# Patient Record
Sex: MALE | Race: WHITE | Employment: OTHER | URBAN - NONMETROPOLITAN AREA
[De-identification: names, ages, dates, MRNs, and addresses within clinical notes are randomized per-mention and may not be internally consistent; named-entity substitution may affect disease eponyms.]

---

## 2022-10-05 ENCOUNTER — OFFICE VISIT (OUTPATIENT)
Dept: FAMILY MEDICINE CLINIC | Age: 67
End: 2022-10-05
Payer: MEDICARE

## 2022-10-05 ENCOUNTER — TELEPHONE (OUTPATIENT)
Dept: FAMILY MEDICINE CLINIC | Age: 67
End: 2022-10-05

## 2022-10-05 VITALS
TEMPERATURE: 97.7 F | HEART RATE: 67 BPM | WEIGHT: 217.4 LBS | SYSTOLIC BLOOD PRESSURE: 170 MMHG | OXYGEN SATURATION: 95 % | DIASTOLIC BLOOD PRESSURE: 90 MMHG | RESPIRATION RATE: 18 BRPM

## 2022-10-05 DIAGNOSIS — I10 PRIMARY HYPERTENSION: ICD-10-CM

## 2022-10-05 DIAGNOSIS — I50.9 CHRONIC CONGESTIVE HEART FAILURE, UNSPECIFIED HEART FAILURE TYPE (HCC): ICD-10-CM

## 2022-10-05 DIAGNOSIS — Z23 FLU VACCINE NEED: ICD-10-CM

## 2022-10-05 DIAGNOSIS — Z95.0 PACEMAKER: ICD-10-CM

## 2022-10-05 PROBLEM — T14.91XA SUICIDE ATTEMPT (HCC): Status: ACTIVE | Noted: 2022-08-17

## 2022-10-05 PROCEDURE — G0008 ADMIN INFLUENZA VIRUS VAC: HCPCS | Performed by: FAMILY MEDICINE

## 2022-10-05 PROCEDURE — 1123F ACP DISCUSS/DSCN MKR DOCD: CPT | Performed by: FAMILY MEDICINE

## 2022-10-05 PROCEDURE — 90674 CCIIV4 VAC NO PRSV 0.5 ML IM: CPT | Performed by: FAMILY MEDICINE

## 2022-10-05 PROCEDURE — 99204 OFFICE O/P NEW MOD 45 MIN: CPT | Performed by: FAMILY MEDICINE

## 2022-10-05 RX ORDER — GABAPENTIN 800 MG/1
1600 TABLET ORAL DAILY
COMMUNITY
Start: 2022-08-19

## 2022-10-05 RX ORDER — CLOPIDOGREL BISULFATE 75 MG/1
75 TABLET ORAL DAILY
COMMUNITY
Start: 2022-08-19

## 2022-10-05 RX ORDER — LISINOPRIL 5 MG/1
5 TABLET ORAL DAILY
COMMUNITY
Start: 2022-08-20

## 2022-10-05 RX ORDER — LEVOTHYROXINE SODIUM 0.15 MG/1
150 TABLET ORAL DAILY
COMMUNITY
Start: 2022-08-20

## 2022-10-05 RX ORDER — ATORVASTATIN CALCIUM 80 MG/1
80 TABLET, FILM COATED ORAL DAILY
COMMUNITY
Start: 2022-08-19

## 2022-10-05 RX ORDER — CARVEDILOL 3.12 MG/1
3.12 TABLET ORAL
COMMUNITY
Start: 2022-08-19

## 2022-10-05 RX ORDER — OMEPRAZOLE 20 MG/1
20 CAPSULE, DELAYED RELEASE ORAL DAILY
COMMUNITY
Start: 2022-08-20

## 2022-10-05 RX ORDER — ADHESIVE BANDAGE 3/4"
BANDAGE TOPICAL
Qty: 1 EACH | Refills: 0 | Status: SHIPPED | OUTPATIENT
Start: 2022-10-05

## 2022-10-05 RX ORDER — SPIRONOLACTONE 25 MG/1
25 TABLET ORAL DAILY
COMMUNITY
Start: 2022-08-19

## 2022-10-05 RX ORDER — ASPIRIN 81 MG/1
81 TABLET ORAL DAILY
COMMUNITY
Start: 2022-08-20

## 2022-10-05 ASSESSMENT — PATIENT HEALTH QUESTIONNAIRE - PHQ9
2. FEELING DOWN, DEPRESSED OR HOPELESS: 0
SUM OF ALL RESPONSES TO PHQ QUESTIONS 1-9: 0
SUM OF ALL RESPONSES TO PHQ QUESTIONS 1-9: 0
1. LITTLE INTEREST OR PLEASURE IN DOING THINGS: 0
SUM OF ALL RESPONSES TO PHQ9 QUESTIONS 1 & 2: 0
SUM OF ALL RESPONSES TO PHQ QUESTIONS 1-9: 0
SUM OF ALL RESPONSES TO PHQ QUESTIONS 1-9: 0

## 2022-10-05 NOTE — PROGRESS NOTES
Vaccine Information Sheet, \"Influenza - Inactivated\"  given to Dinah Morris, or parent/legal guardian of  Dinah Morris and verbalized understanding. Patient responses:    Have you ever had a reaction to a flu vaccine? No  Do you have any current illness? No  Have you ever had Guillian Catarina Syndrome? No  Do you have a serious allergy to any of the follow: Neomycin, Polymyxin, Thimerosal, eggs or egg products? No    Flu vaccine given per order. Please see immunization tab. Risks and benefits explained. Current VIS given. Immunizations Administered       Name Date Dose Route    Influenza, FLUCELVAX, (age 10 mo+), MDCK, PF, 0.5mL 10/5/2022 0.5 mL Intramuscular    Site: Deltoid- Left    Lot: [id number]    NDC: 67892-873-86        ?

## 2022-10-05 NOTE — PROGRESS NOTES
[facility]  [address]  [address] [place] [address]  Dept: [phone]  Loc: [phone]        ASSESSMENT/PLAN:    1. Primary hypertension  -     Blood Pressure Monitoring (BLOOD PRESSURE CUFF) MISC; Disp-1 each, R-0, NormalMonitor blood pressure daily  - Elevated blood pressure in the office. Patient reports that he has been taking his medications as prescribed and his blood pressure usually gets higher when he is at the doctor's office. Patient was encouraged to record home blood pressure and bring numbers next visit. May have to adjust antihypertensive  2. Pacemaker  -     Francois Rogers MD, Cardiology, [facility]  - Patient reports having a pacemaker/AICD in place. Patient reports that his device is electronically monitored by his cardiologist in [facility]. Patient was encouraged to establish relationship with a cardiologist in [facility]. 3. Chronic congestive heart failure, unspecified heart failure type [facility])  -     Francois Rogers MD, Cardiology, [facility]  4. Flu vaccine need  -     Influenza, FLUCELVAX, (age 10 mo+), IM, Preservative Free, 0.5 mL      Return in about 2 months (around 12/5/2022). Patient's Name: Jason Parra   YOB: 1955   Age: 79 y.o. Date of service: 10/5/2022    Chief Complaint:   Chief Complaint   Patient presents with    New Patient     Reports that his Rx for Ed is not working    Flu Vaccine     Pt requesting flu vaccine        Patient ID: Jason Parra is a 79 y.o. male. Chief Complaint   Patient presents with    New Patient     Reports that his Rx for Ed is not working    Flu Vaccine     Pt requesting flu vaccine       HPI   Patient is a 57-year-old male with multiple medical complaints who presents to the office for follow-up. Patient reports that he recently moved to the area and is establishing care with PCP.   Has a history of throat cancer, hypertension, congestive heart failure and other medical conditions. Patient reports that he has been off of his medications and has been taking his medications as prescribed. Patient has a pacemaker/AICD in place and had a cardiologist while he was in [facility]. Patient reports that his generic Cialis is not helping with his erectile dysfunction. Reports that he takes up to 2 pills total of 10 mg without significant improvement. 12 point review of systems were negative other than in the HPI     Physical Exam  General: alert, awake, and oriented to time, place, person, and situation. Not in acute distress   Ear, nose, throat, Head: Normal external ears, pupils are equally round, EOMI, normocephalic/atraumatic  Heart: regular rate and rhythm,no audible friction rub  Lungs: clear to auscultation bilaterally, no audible crackles, no audible wheezes, no audible rhonchi    Abdomen: normal bowel sounds, soft abdomen, non-tender, no palpable masses  Extremities: no edema, warm, no cyanosis, no clubbing.  Good capillary refill   Peripheral vascular: 2+ pulses symmetric (radial)  Neuro: sensation intact and symmetric  Psych: normal affect, normal thoughts     Patient History:  Past Medical History:   Diagnosis Date    CHF (congestive heart failure) ([facility])     Heart attack ([facility] [address]     Pacemaker     Stroke ([facility]     Throat cancer ([facility] [address]      Past Surgical History:   Procedure Laterality Date    ANKLE SURGERY      APPENDECTOMY      TRACHEOSTOMY       Social History     Socioeconomic History    Marital status: Single     Spouse name: Not on file    Number of children: Not on file    Years of education: Not on file    Highest education level: Not on file   Occupational History    Not on file   Tobacco Use    Smoking status: Former     Years: 42.00     Types: Cigarettes    Smokeless tobacco: Never   Vaping Use    Vaping Use: Never used   Substance and Sexual Activity    Alcohol use: Not on file    Drug use: Not on file    Sexual activity: Not on file   Other Topics Concern    Not on file   Social History Narrative    Not on file     Social Determinants of Health     Financial Resource Strain: Not on file   Food Insecurity: Not on file   Transportation Needs: Not on file   Physical Activity: Not on file   Stress: Not on file   Social Connections: Not on file   Intimate Partner Violence: Not on file   Housing Stability: Not on file     Immunization History   Administered Date(s) Administered    Influenza, FLUCELVAX, (age 10 mo+), MDCK, PF, 0.5mL 10/05/2022     No Known Allergies  Family History   Problem Relation Age of Onset    Diabetes Mother     Heart Attack Mother     Alcohol Abuse Father     Lung Cancer Father     Cancer Brother     Cancer Brother     Cancer Brother          Current Outpatient Medications   Medication Sig Dispense Refill    atorvastatin (LIPITOR) 80 MG tablet Take 80 mg by mouth daily      levothyroxine (SYNTHROID) 150 MCG tablet Take 150 mcg by mouth daily      lisinopril (PRINIVIL;ZESTRIL) 5 MG tablet Take 5 mg by mouth daily      spironolactone (ALDACTONE) 25 MG tablet Take 25 mg by mouth daily      clopidogrel (PLAVIX) 75 MG tablet Take 75 mg by mouth daily      omeprazole (PRILOSEC) 20 MG delayed release capsule Take 20 mg by mouth daily      aspirin 81 MG EC tablet Take 81 mg by mouth daily      carvedilol (COREG) 3.125 MG tablet Take 3.125 mg by mouth      gabapentin (NEURONTIN) 800 MG tablet Take 1,600 mg by mouth daily. TADALAFIL PO Take by mouth      Blood Pressure Monitoring (BLOOD PRESSURE CUFF) MISC Monitor blood pressure daily 1 each 0     No current facility-administered medications for this visit. Objective:  Vitals:  Vitals:    10/05/22 1412   BP: (!) 170/90   Pulse: 67   Resp: 18   Temp: 97.7 °F (36.5 °C)   SpO2: 95%      BP Readings from Last 4 Encounters:   10/05/22 (!) 170/90      There is no height or weight on file to calculate BMI. All questions answered to patient's satisfaction.  The patient was counseled regarding impressions, instructions for management and importance of compliance with treatment. Return in about 2 months (around 12/5/2022).     Gael Guzmán MD

## 2022-10-05 NOTE — TELEPHONE ENCOUNTER
Pt states that he is suppose to have a Rx for Nitro with him at tall times. States that he has not had Rx for awhile. Requesting Rx be sent to pharmacy.

## 2022-10-06 RX ORDER — NITROGLYCERIN 0.3 MG/1
TABLET SUBLINGUAL
Qty: 30 TABLET | Refills: 3 | Status: SHIPPED | OUTPATIENT
Start: 2022-10-06

## 2022-10-07 NOTE — ADDENDUM NOTE
Addended by: José Miguel Magana on: 10/6/2022 08:44 AM     Modules accepted: Orders Patient with one or more new problems requiring additional work-up/treatment.

## 2024-01-02 SDOH — HEALTH STABILITY: PHYSICAL HEALTH: ON AVERAGE, HOW MANY DAYS PER WEEK DO YOU ENGAGE IN MODERATE TO STRENUOUS EXERCISE (LIKE A BRISK WALK)?: 0 DAYS

## 2024-01-02 SDOH — HEALTH STABILITY: PHYSICAL HEALTH: ON AVERAGE, HOW MANY MINUTES DO YOU ENGAGE IN EXERCISE AT THIS LEVEL?: 0 MIN

## 2024-01-03 ENCOUNTER — PATIENT MESSAGE (OUTPATIENT)
Dept: FAMILY MEDICINE CLINIC | Age: 69
End: 2024-01-03

## 2024-01-03 ENCOUNTER — OFFICE VISIT (OUTPATIENT)
Dept: FAMILY MEDICINE CLINIC | Age: 69
End: 2024-01-03
Payer: COMMERCIAL

## 2024-01-03 ENCOUNTER — TELEPHONE (OUTPATIENT)
Dept: FAMILY MEDICINE CLINIC | Age: 69
End: 2024-01-03

## 2024-01-03 VITALS
BODY MASS INDEX: 32.76 KG/M2 | SYSTOLIC BLOOD PRESSURE: 150 MMHG | DIASTOLIC BLOOD PRESSURE: 90 MMHG | HEART RATE: 90 BPM | HEIGHT: 69 IN | OXYGEN SATURATION: 93 % | WEIGHT: 221.2 LBS

## 2024-01-03 DIAGNOSIS — R91.1 LUNG NODULE: ICD-10-CM

## 2024-01-03 DIAGNOSIS — I50.9 CHRONIC CONGESTIVE HEART FAILURE, UNSPECIFIED HEART FAILURE TYPE (HCC): ICD-10-CM

## 2024-01-03 DIAGNOSIS — Z76.89 ENCOUNTER TO ESTABLISH CARE: Primary | ICD-10-CM

## 2024-01-03 DIAGNOSIS — Z86.73 HISTORY OF CVA IN ADULTHOOD: ICD-10-CM

## 2024-01-03 DIAGNOSIS — Z95.0 PACEMAKER: ICD-10-CM

## 2024-01-03 DIAGNOSIS — I25.2 PERSONAL HISTORY OF MI (MYOCARDIAL INFARCTION): ICD-10-CM

## 2024-01-03 DIAGNOSIS — E89.0 POSTOPERATIVE HYPOTHYROIDISM: ICD-10-CM

## 2024-01-03 DIAGNOSIS — Z12.5 PROSTATE CANCER SCREENING: ICD-10-CM

## 2024-01-03 DIAGNOSIS — Z90.02 HISTORY OF LARYNGECTOMY: ICD-10-CM

## 2024-01-03 DIAGNOSIS — I10 PRIMARY HYPERTENSION: ICD-10-CM

## 2024-01-03 DIAGNOSIS — E78.2 MIXED HYPERLIPIDEMIA: ICD-10-CM

## 2024-01-03 DIAGNOSIS — D73.89 LESION OF SPLEEN: ICD-10-CM

## 2024-01-03 DIAGNOSIS — T14.91XA SUICIDE ATTEMPT (HCC): ICD-10-CM

## 2024-01-03 DIAGNOSIS — Z85.21 HISTORY OF LARYNGEAL CANCER: ICD-10-CM

## 2024-01-03 PROCEDURE — 3077F SYST BP >= 140 MM HG: CPT | Performed by: NURSE PRACTITIONER

## 2024-01-03 PROCEDURE — 3080F DIAST BP >= 90 MM HG: CPT | Performed by: NURSE PRACTITIONER

## 2024-01-03 PROCEDURE — 1123F ACP DISCUSS/DSCN MKR DOCD: CPT | Performed by: NURSE PRACTITIONER

## 2024-01-03 PROCEDURE — 99204 OFFICE O/P NEW MOD 45 MIN: CPT | Performed by: NURSE PRACTITIONER

## 2024-01-03 SDOH — ECONOMIC STABILITY: HOUSING INSECURITY
IN THE LAST 12 MONTHS, WAS THERE A TIME WHEN YOU DID NOT HAVE A STEADY PLACE TO SLEEP OR SLEPT IN A SHELTER (INCLUDING NOW)?: PATIENT DECLINED

## 2024-01-03 SDOH — ECONOMIC STABILITY: FOOD INSECURITY: WITHIN THE PAST 12 MONTHS, YOU WORRIED THAT YOUR FOOD WOULD RUN OUT BEFORE YOU GOT MONEY TO BUY MORE.: PATIENT DECLINED

## 2024-01-03 SDOH — ECONOMIC STABILITY: INCOME INSECURITY: HOW HARD IS IT FOR YOU TO PAY FOR THE VERY BASICS LIKE FOOD, HOUSING, MEDICAL CARE, AND HEATING?: NOT HARD AT ALL

## 2024-01-03 SDOH — ECONOMIC STABILITY: FOOD INSECURITY: WITHIN THE PAST 12 MONTHS, THE FOOD YOU BOUGHT JUST DIDN'T LAST AND YOU DIDN'T HAVE MONEY TO GET MORE.: PATIENT DECLINED

## 2024-01-03 ASSESSMENT — PATIENT HEALTH QUESTIONNAIRE - PHQ9
SUM OF ALL RESPONSES TO PHQ QUESTIONS 1-9: 0
2. FEELING DOWN, DEPRESSED OR HOPELESS: 0
1. LITTLE INTEREST OR PLEASURE IN DOING THINGS: 0
SUM OF ALL RESPONSES TO PHQ QUESTIONS 1-9: 0
SUM OF ALL RESPONSES TO PHQ QUESTIONS 1-9: 0
SUM OF ALL RESPONSES TO PHQ9 QUESTIONS 1 & 2: 0
SUM OF ALL RESPONSES TO PHQ QUESTIONS 1-9: 0

## 2024-01-03 NOTE — TELEPHONE ENCOUNTER
From: Brayan Galeana  To: Beverly Marie  Sent: 1/3/2024 4:29 PM EST  Subject: Meds     Hello I just received a Message from [facility] that they can't refill my meds realized I was no longer with [facility] insurance. Would you please have these filled all of them thru [facility] thank you any questions please call [phone]    22-Oct-2023 00:16 22-Oct-2023 00:19

## 2024-01-03 NOTE — PROGRESS NOTES
Years since quittin.6    Smokeless tobacco: Never   Substance Use Topics    Alcohol use: Not Currently        Vitals:    24 1009   BP: (!) 150/90   Site: Left Upper Arm   Position: Sitting   Pulse: 90   SpO2: 93%   Weight: 100.3 kg (221 lb 3.2 oz)   Height: 1.753 m (5' 9\")     Estimated body mass index is 32.67 kg/m² as calculated from the following:    Height as of this encounter: 1.753 m (5' 9\").    Weight as of this encounter: 100.3 kg (221 lb 3.2 oz).    Physical Exam  Constitutional:       General: He is not in acute distress.     Appearance: Normal appearance. He is not ill-appearing, toxic-appearing or diaphoretic.   Eyes:      Extraocular Movements: Extraocular movements intact.      Pupils: Pupils are equal, round, and reactive to light.   Cardiovascular:      Rate and Rhythm: Normal rate and regular rhythm.      Heart sounds: Normal heart sounds.   Pulmonary:      Effort: Pulmonary effort is normal.      Breath sounds: No wheezing or rhonchi.      Comments: Rhonchi and wheezing cleared with airway clearing   Abdominal:      General: Bowel sounds are normal. There is no distension.      Palpations: Abdomen is soft. There is no mass.      Tenderness: There is no abdominal tenderness.      Hernia: No hernia is present.   Musculoskeletal:         General: Normal range of motion.      Cervical back: Normal range of motion.      Right lower leg: Edema present.      Left lower leg: Edema present.      Comments: +1 pitting BLE edema   Skin:     General: Skin is warm and dry.      Comments: Tracheotomy stoma with prosthetic in place and visible.    Neurological:      General: No focal deficit present.      Mental Status: He is alert and oriented to person, place, and time. Mental status is at baseline.      Motor: No weakness.      Gait: Gait normal.   Psychiatric:         Mood and Affect: Mood normal.         Behavior: Behavior normal.         Thought Content: Thought content normal.         Judgment:

## 2024-01-03 NOTE — TELEPHONE ENCOUNTER
----- Message from GOGO Campbell - NP sent at 1/3/2024  3:58 PM EST -----  Nataliia - please call [facility] and find out about his brandon use because it was prescribed in [place] and I cannot see it on PDMP       Dr cortés - you think ok to prescribe? I told him id do drug screen and contract and he was ok w that.     Gabapentin   States he takes two 800mg tabs in the afternoons.   Has been on it since 2014   States his last script was in [place], dr aparicio.   States \"my feet feel like they're gonna bust\" - reason for taking.   States he notices a difference without it.   Pre PCP was prescribing

## 2024-01-04 NOTE — TELEPHONE ENCOUNTER
Spoke to Wendy at [facility] script was written for 800 mg tabs Last refill 10/5/23 for 200 tabs for 100 day supply. Pt takes 2 tabs daily. Written by Husam Yo.

## 2024-01-04 NOTE — TELEPHONE ENCOUNTER
Please schedule him to come in for urine drug screen and contract. When will he be out of gabapentin and the rest of his medications.   He told me he had medication on the way in the mail this week.

## 2024-01-05 NOTE — TELEPHONE ENCOUNTER
Spoke with pt, he needs atorvastatin filled now but has 30 days left on everything else. Pt scheduled 1/11/24 for med contract and UDS

## 2024-01-05 NOTE — TELEPHONE ENCOUNTER
Please call pt to schedule and tell me what meds need refills right now and how much he has left at home. Do they need to go to local pharm for short supply or does he have enough to wait on delivery

## 2024-01-05 NOTE — TELEPHONE ENCOUNTER
Ok. Please let him know I would like to have labs before I fill the lipitor. Need to check liver function   It will be ok to be without lipitor for a  few days until labs done either tomorrow or Monday.

## 2024-01-08 ENCOUNTER — HOSPITAL ENCOUNTER (OUTPATIENT)
Age: 69
Discharge: HOME | End: 2024-01-08
Payer: COMMERCIAL

## 2024-01-08 DIAGNOSIS — E78.2 MIXED HYPERLIPIDEMIA: ICD-10-CM

## 2024-01-08 DIAGNOSIS — Z12.5 PROSTATE CANCER SCREENING: ICD-10-CM

## 2024-01-08 DIAGNOSIS — I10 PRIMARY HYPERTENSION: ICD-10-CM

## 2024-01-08 DIAGNOSIS — E89.0 POSTOPERATIVE HYPOTHYROIDISM: ICD-10-CM

## 2024-01-08 LAB
ALBUMIN SERPL-MCNC: 4.2 GM/DL (ref 3.4–5)
ALP BLD-CCNC: 118 IU/L (ref 40–129)
ALT SERPL-CCNC: 16 U/L (ref 10–40)
ANION GAP SERPL CALCULATED.3IONS-SCNC: 10 MMOL/L (ref 7–16)
AST SERPL-CCNC: 17 IU/L (ref 15–37)
BILIRUB SERPL-MCNC: 0.7 MG/DL (ref 0–1)
BUN SERPL-MCNC: 12 MG/DL (ref 6–23)
CALCIUM SERPL-MCNC: 9.1 MG/DL (ref 8.3–10.6)
CHLORIDE BLD-SCNC: 103 MMOL/L (ref 99–110)
CHOLEST SERPL-MCNC: 136 MG/DL
CO2: 28 MMOL/L (ref 21–32)
CREAT SERPL-MCNC: 0.9 MG/DL (ref 0.9–1.3)
GFR SERPL CREATININE-BSD FRML MDRD: >60 ML/MIN/1.73M2
GLUCOSE SERPL-MCNC: 116 MG/DL (ref 70–99)
HDLC SERPL-MCNC: 33 MG/DL
LDLC SERPL CALC-MCNC: 79 MG/DL
POTASSIUM SERPL-SCNC: 4.1 MMOL/L (ref 3.5–5.1)
PROSTATE SPECIFIC ANTIGEN: 2.76 NG/ML (ref 0–4)
SODIUM BLD-SCNC: 141 MMOL/L (ref 135–145)
T4 FREE SERPL-MCNC: 1.4 NG/DL (ref 0.9–1.8)
TOTAL PROTEIN: 7.1 GM/DL (ref 6.4–8.2)
TRIGL SERPL-MCNC: 121 MG/DL
TSH SERPL DL<=0.005 MIU/L-ACNC: 0.05 UIU/ML (ref 0.27–4.2)

## 2024-01-08 PROCEDURE — 80061 LIPID PANEL: CPT

## 2024-01-08 PROCEDURE — 80053 COMPREHEN METABOLIC PANEL: CPT

## 2024-01-08 PROCEDURE — G0103 PSA SCREENING: HCPCS

## 2024-01-08 PROCEDURE — 36415 COLL VENOUS BLD VENIPUNCTURE: CPT

## 2024-01-08 PROCEDURE — 84443 ASSAY THYROID STIM HORMONE: CPT

## 2024-01-08 PROCEDURE — 84439 ASSAY OF FREE THYROXINE: CPT

## 2024-01-11 ENCOUNTER — OFFICE VISIT (OUTPATIENT)
Dept: FAMILY MEDICINE CLINIC | Age: 69
End: 2024-01-11
Payer: COMMERCIAL

## 2024-01-11 VITALS
HEART RATE: 89 BPM | OXYGEN SATURATION: 98 % | HEIGHT: 69 IN | WEIGHT: 221.6 LBS | DIASTOLIC BLOOD PRESSURE: 88 MMHG | SYSTOLIC BLOOD PRESSURE: 130 MMHG | BODY MASS INDEX: 32.82 KG/M2

## 2024-01-11 DIAGNOSIS — G62.9 PERIPHERAL POLYNEUROPATHY: ICD-10-CM

## 2024-01-11 DIAGNOSIS — Z85.21 HISTORY OF LARYNGEAL CANCER: ICD-10-CM

## 2024-01-11 DIAGNOSIS — Z13.1 SCREENING FOR DIABETES MELLITUS: ICD-10-CM

## 2024-01-11 DIAGNOSIS — E89.0 POSTOPERATIVE HYPOTHYROIDISM: ICD-10-CM

## 2024-01-11 DIAGNOSIS — M79.671 CHRONIC PAIN OF BOTH FEET: Primary | ICD-10-CM

## 2024-01-11 DIAGNOSIS — E78.2 MIXED HYPERLIPIDEMIA: ICD-10-CM

## 2024-01-11 DIAGNOSIS — Z51.81 MEDICATION MONITORING ENCOUNTER: ICD-10-CM

## 2024-01-11 DIAGNOSIS — G89.29 CHRONIC PAIN OF BOTH FEET: Primary | ICD-10-CM

## 2024-01-11 DIAGNOSIS — I10 PRIMARY HYPERTENSION: ICD-10-CM

## 2024-01-11 DIAGNOSIS — Z86.73 HISTORY OF CVA IN ADULTHOOD: ICD-10-CM

## 2024-01-11 DIAGNOSIS — I25.2 PERSONAL HISTORY OF MI (MYOCARDIAL INFARCTION): ICD-10-CM

## 2024-01-11 DIAGNOSIS — Z95.0 PACEMAKER: ICD-10-CM

## 2024-01-11 DIAGNOSIS — R73.9 HYPERGLYCEMIA: ICD-10-CM

## 2024-01-11 DIAGNOSIS — M79.672 CHRONIC PAIN OF BOTH FEET: Primary | ICD-10-CM

## 2024-01-11 LAB
ANNOTATION COMMENT IMP: NORMAL
BILIRUB UR QL STRIP.AUTO: NEGATIVE
CLARITY UR: CLEAR
COLOR UR: YELLOW
GLUCOSE UR STRIP.AUTO-MCNC: NEGATIVE MG/DL
HBA1C MFR BLD: 5.5 %
HGB UR QL STRIP.AUTO: NEGATIVE
KETONES UR STRIP.AUTO-MCNC: NEGATIVE MG/DL
LEUKOCYTE ESTERASE UR QL STRIP.AUTO: NEGATIVE
NITRITE UR QL STRIP.AUTO: NEGATIVE
PH UR STRIP.AUTO: 7.5 [PH] (ref 5–8)
PROT UR STRIP.AUTO-MCNC: NEGATIVE MG/DL
SP GR UR STRIP.AUTO: 1 (ref 1–1.03)
UA DIPSTICK W REFLEX MICRO PNL UR: NORMAL
URN SPEC COLLECT METH UR: NORMAL
UROBILINOGEN UR STRIP-ACNC: 0.2 E.U./DL

## 2024-01-11 PROCEDURE — 1123F ACP DISCUSS/DSCN MKR DOCD: CPT | Performed by: NURSE PRACTITIONER

## 2024-01-11 PROCEDURE — 3075F SYST BP GE 130 - 139MM HG: CPT | Performed by: NURSE PRACTITIONER

## 2024-01-11 PROCEDURE — 36415 COLL VENOUS BLD VENIPUNCTURE: CPT | Performed by: NURSE PRACTITIONER

## 2024-01-11 PROCEDURE — 83036 HEMOGLOBIN GLYCOSYLATED A1C: CPT | Performed by: NURSE PRACTITIONER

## 2024-01-11 PROCEDURE — 99214 OFFICE O/P EST MOD 30 MIN: CPT | Performed by: NURSE PRACTITIONER

## 2024-01-11 PROCEDURE — 3079F DIAST BP 80-89 MM HG: CPT | Performed by: NURSE PRACTITIONER

## 2024-01-11 RX ORDER — LISINOPRIL 5 MG/1
5 TABLET ORAL DAILY
Qty: 90 TABLET | Refills: 2 | Status: SHIPPED | OUTPATIENT
Start: 2024-01-11

## 2024-01-11 RX ORDER — OMEPRAZOLE 20 MG/1
20 CAPSULE, DELAYED RELEASE ORAL DAILY
Qty: 90 CAPSULE | Refills: 2 | Status: SHIPPED | OUTPATIENT
Start: 2024-01-11

## 2024-01-11 RX ORDER — ATORVASTATIN CALCIUM 80 MG/1
80 TABLET, FILM COATED ORAL DAILY
Qty: 90 TABLET | Refills: 2 | Status: SHIPPED | OUTPATIENT
Start: 2024-01-11

## 2024-01-11 RX ORDER — CARVEDILOL 3.12 MG/1
3.12 TABLET ORAL 2 TIMES DAILY
Qty: 180 TABLET | Refills: 2 | Status: SHIPPED | OUTPATIENT
Start: 2024-01-11 | End: 2024-04-10

## 2024-01-11 RX ORDER — CLOPIDOGREL BISULFATE 75 MG/1
75 TABLET ORAL DAILY
Qty: 90 TABLET | Refills: 2 | Status: SHIPPED | OUTPATIENT
Start: 2024-01-11

## 2024-01-11 RX ORDER — SPIRONOLACTONE 25 MG/1
25 TABLET ORAL DAILY
Qty: 90 TABLET | Refills: 2 | Status: SHIPPED | OUTPATIENT
Start: 2024-01-11

## 2024-01-12 DIAGNOSIS — E89.0 POSTOPERATIVE HYPOTHYROIDISM: Primary | ICD-10-CM

## 2024-01-12 LAB
T3 SERPL-MCNC: 1.16 NG/ML (ref 0.8–2)
T4 FREE SERPL-MCNC: 1.5 NG/DL (ref 0.9–1.8)
TSH SERPL DL<=0.005 MIU/L-ACNC: 0.05 UIU/ML (ref 0.27–4.2)

## 2024-01-12 RX ORDER — LEVOTHYROXINE SODIUM 137 UG/1
137 TABLET ORAL DAILY
Qty: 30 TABLET | Refills: 1 | Status: SHIPPED | OUTPATIENT
Start: 2024-01-12

## 2024-01-15 LAB

## 2024-01-17 ENCOUNTER — HOSPITAL ENCOUNTER (OUTPATIENT)
Dept: INFUSION THERAPY | Age: 69
Discharge: HOME | End: 2024-01-17
Payer: COMMERCIAL

## 2024-01-17 ENCOUNTER — INITIAL CONSULT (OUTPATIENT)
Dept: ONCOLOGY | Age: 69
End: 2024-01-17
Payer: COMMERCIAL

## 2024-01-17 VITALS
SYSTOLIC BLOOD PRESSURE: 172 MMHG | TEMPERATURE: 97.7 F | DIASTOLIC BLOOD PRESSURE: 81 MMHG | OXYGEN SATURATION: 96 % | BODY MASS INDEX: 32.32 KG/M2 | WEIGHT: 218.2 LBS | HEART RATE: 75 BPM | HEIGHT: 69 IN

## 2024-01-17 DIAGNOSIS — R91.1 LUNG NODULE: Primary | ICD-10-CM

## 2024-01-17 DIAGNOSIS — Z85.21 HISTORY OF LARYNGEAL CANCER: ICD-10-CM

## 2024-01-17 DIAGNOSIS — D73.89 LESION OF SPLEEN: ICD-10-CM

## 2024-01-17 PROCEDURE — 99202 OFFICE O/P NEW SF 15 MIN: CPT

## 2024-01-17 PROCEDURE — 99204 OFFICE O/P NEW MOD 45 MIN: CPT | Performed by: INTERNAL MEDICINE

## 2024-01-17 PROCEDURE — 1123F ACP DISCUSS/DSCN MKR DOCD: CPT | Performed by: INTERNAL MEDICINE

## 2024-01-17 PROCEDURE — 3079F DIAST BP 80-89 MM HG: CPT | Performed by: INTERNAL MEDICINE

## 2024-01-17 PROCEDURE — 3077F SYST BP >= 140 MM HG: CPT | Performed by: INTERNAL MEDICINE

## 2024-01-17 NOTE — PROGRESS NOTES
MA Rooming Questions  Patient: Brayan Galeana  MRN: [id number]    Date: 1/17/2024      NEW PATIENT     5. Did the patient have a depression screening completed today? No    No data recorded     PHQ-9 Given to (if applicable):               PHQ-9 Score (if applicable):                     [] Positive     []  Negative              Does question #9 need addressed (if applicable)                     [] Yes    []  No               Martha Rosenthal CMA

## 2024-01-18 ENCOUNTER — CLINICAL DOCUMENTATION (OUTPATIENT)
Dept: ONCOLOGY | Age: 69
End: 2024-01-18

## 2024-01-18 RX ORDER — GABAPENTIN 800 MG/1
1600 TABLET ORAL DAILY
Qty: 60 TABLET | Refills: 0 | Status: SHIPPED | OUTPATIENT
Start: 2024-01-18 | End: 2024-02-17

## 2024-01-18 NOTE — PROGRESS NOTES
Patient Name:  Brayan Galeana  Patient :  1955  Patient MRN:  [id number]     Primary Oncologist: Butch Acevedo MD  Referring Provider: Beverly Marie APRN - HEN[facility]     Date of Service: 2024      Reason for Consult: To evaluate the patient with left upper lobe lung nodule and splenic lesion.      Chief Complaint:    Chief Complaint   Patient presents with    New Patient     Patient Active Problem List:     Suicide attempt (HCC)     Pacemaker     CHF (congestive heart failure) (HCC)     Lung nodule     Lesion of spleen     History of CVA in adulthood     History of laryngeal cancer     Personal history of MI (myocardial infarction)     Postoperative hypothyroidism     Mixed hyperlipidemia     History of laryngectomy     Primary hypertension    HPI:   Brayan Galeana is a 68 y.o. male with medical history significant for HTN, hyperlipidemia, hypothyroidism, CAD, CHF, s/p pacemaker, history of laryngeal cancer, s/p laryngectomy in  followed by adjuvant radiation therapy, referred to me on 24 for evaluation of lung nodule and splenic lesion.     He has been following with ENT surgeon at [facility] for his history of laryngeal cancer. He had CT soft tissue neck on 23 and it showed spiculated 1.9 cm partially visualized subpleural nodule in left upper lobe.     Subsequently had CT chest with contrast on 23 and it showed  1.5 x 1.7 cm peripheral left upper lobe nodule noted with a small peripheral lucency. This could represent a cavitating neoplastic nodule versus infection. Consider short-term follow-up CT, PET/CT and/or biopsy for further evaluation. 8mm hyperdense area of nodularity noted in the spleen. Both benign etiology such is a hemangioma and hypervascular metastasis are considerations. Consider a follow-up splenic protocol follow-up MRI.     His oncologist at [facility] requested CT guided biopsy but he cancelled it since he wants to have it done closer to home. He was referred to me for further

## 2024-01-18 NOTE — PROGRESS NOTES
[employer] Liaison [facility] called for images from CT neck and chest to be pushed to [employer] system.

## 2024-01-22 ENCOUNTER — CLINICAL DOCUMENTATION (OUTPATIENT)
Dept: ONCOLOGY | Age: 69
End: 2024-01-22

## 2024-01-22 DIAGNOSIS — Z85.21 HISTORY OF LARYNGEAL CANCER: ICD-10-CM

## 2024-01-22 DIAGNOSIS — Z95.0 PACEMAKER: Primary | ICD-10-CM

## 2024-01-22 DIAGNOSIS — D73.89 LESION OF SPLEEN: ICD-10-CM

## 2024-01-22 DIAGNOSIS — R91.1 LUNG NODULE: Primary | ICD-10-CM

## 2024-01-22 NOTE — PROGRESS NOTES
Order for a chest x-ray placed per request of MRI department due to the patient having a pacemaker. This Xray is required prior to having the MRI.

## 2024-01-22 NOTE — PROGRESS NOTES
Reviewed Hereditary Cancer Genetic screening form, with information provided he does not meet criteria but indicates a desire for testing.

## 2024-01-23 ENCOUNTER — TELEPHONE (OUTPATIENT)
Dept: ONCOLOGY | Age: 69
End: 2024-01-23

## 2024-01-23 NOTE — TELEPHONE ENCOUNTER
Patient called given time and prep for biopsy to be done [facility]. MRI abdomen will be scheduled by central scheduling when pacemaker is approved, patient aware.

## 2024-01-26 ENCOUNTER — HOSPITAL ENCOUNTER (OUTPATIENT)
Dept: PET IMAGING | Age: 69
Discharge: HOME | End: 2024-01-26
Attending: INTERNAL MEDICINE
Payer: COMMERCIAL

## 2024-01-26 DIAGNOSIS — D73.89 LESION OF SPLEEN: ICD-10-CM

## 2024-01-26 DIAGNOSIS — Z85.21 HISTORY OF LARYNGEAL CANCER: ICD-10-CM

## 2024-01-26 DIAGNOSIS — R91.1 LUNG NODULE: ICD-10-CM

## 2024-01-26 PROCEDURE — 2580000003 HC RX 258: Performed by: INTERNAL MEDICINE

## 2024-01-26 PROCEDURE — 3430000000 HC RX DIAGNOSTIC RADIOPHARMACEUTICAL: Performed by: INTERNAL MEDICINE

## 2024-01-26 PROCEDURE — 78815 PET IMAGE W/CT SKULL-THIGH: CPT

## 2024-01-26 PROCEDURE — A9609 HC RX DIAGNOSTIC RADIOPHARMACEUTICAL: HCPCS | Performed by: INTERNAL MEDICINE

## 2024-01-26 RX ORDER — FLUDEOXYGLUCOSE F 18 200 MCI/ML
12.71 INJECTION, SOLUTION INTRAVENOUS
Status: COMPLETED | OUTPATIENT
Start: 2024-01-26 | End: 2024-01-26

## 2024-01-26 RX ORDER — SODIUM CHLORIDE 0.9 % (FLUSH) 0.9 %
10 SYRINGE (ML) INJECTION PRN
Status: COMPLETED | OUTPATIENT
Start: 2024-01-26 | End: 2024-01-26

## 2024-01-26 RX ADMIN — FLUDEOXYGLUCOSE F 18 12.71 MILLICURIE: 200 INJECTION, SOLUTION INTRAVENOUS at 13:32

## 2024-01-26 RX ADMIN — SODIUM CHLORIDE, PRESERVATIVE FREE 10 ML: 5 INJECTION INTRAVENOUS at 13:32

## 2024-01-31 ENCOUNTER — HOSPITAL ENCOUNTER (OUTPATIENT)
Dept: MRI IMAGING | Age: 69
Discharge: HOME | End: 2024-01-31
Attending: INTERNAL MEDICINE
Payer: COMMERCIAL

## 2024-01-31 ENCOUNTER — HOSPITAL ENCOUNTER (OUTPATIENT)
Dept: GENERAL RADIOLOGY | Age: 69
Discharge: HOME | End: 2024-01-31
Attending: INTERNAL MEDICINE
Payer: COMMERCIAL

## 2024-01-31 ENCOUNTER — HOSPITAL ENCOUNTER (OUTPATIENT)
Age: 69
Discharge: HOME | End: 2024-01-31
Attending: INTERNAL MEDICINE
Payer: COMMERCIAL

## 2024-01-31 DIAGNOSIS — Z95.0 PACEMAKER: ICD-10-CM

## 2024-01-31 DIAGNOSIS — Z85.21 HISTORY OF LARYNGEAL CANCER: ICD-10-CM

## 2024-01-31 DIAGNOSIS — D73.89 LESION OF SPLEEN: ICD-10-CM

## 2024-01-31 DIAGNOSIS — R91.1 LUNG NODULE: ICD-10-CM

## 2024-01-31 PROCEDURE — 71046 X-RAY EXAM CHEST 2 VIEWS: CPT

## 2024-01-31 PROCEDURE — 74183 MRI ABD W/O CNTR FLWD CNTR: CPT

## 2024-01-31 PROCEDURE — 6360000004 HC RX CONTRAST MEDICATION: Performed by: INTERNAL MEDICINE

## 2024-01-31 PROCEDURE — A9577 INJ MULTIHANCE: HCPCS | Performed by: INTERNAL MEDICINE

## 2024-01-31 RX ADMIN — GADOBENATE DIMEGLUMINE 20 ML: 529 INJECTION, SOLUTION INTRAVENOUS at 10:03

## 2024-02-08 NOTE — PROGRESS NOTES
IR Procedure at [facility]:  Spoke with patient and he will arrive at 0630 at [facility] on 2/15/2024 for his procedure at 0800. Also went over below instrucitons. Patient will call Dr Acevedo's office about stopping his plavix and aspirin, and will let me know what he was told.He will take his other medications as scheduled. ADDENDUM: Spoke with patient and he stated that \" his last dose of plavix and aspirin was on 2/9/2024.\"    NPO at Midnight   2.   Follow your directions as prescribed by the doctor for your procedure and medications.  3.   Consult your provider as to when to stop blood thinner  4.   Do not take any pain medication within 6 hours of your procedure  5.   Do not drink any alcoholic beverages or use any street drugs 24 hours before procedure.  6.   Please wear simple, loose fitting clothing to the hospital.  Do not bring valuables (money,             credit cards, checkbooks, etc.)     7.   If you  have a Living Will and Durable Power of  for Healthcare, please bring in a copy.  8.   Please bring picture ID,  insurance card, paperwork from the doctors office            (H & P, Consent,  & card for implantable devices).  9.   Report to the information desk on the ground floor.  10. Take a shower the night before or morning of your procedure, do not apply any lotion, oil or powder.  11. If you are going to be sedated for the procedure, you will need a responsible adult to drive you home.

## 2024-02-09 ENCOUNTER — TELEPHONE (OUTPATIENT)
Dept: ONCOLOGY | Age: 69
End: 2024-02-09

## 2024-02-09 NOTE — TELEPHONE ENCOUNTER
This RN spoke with patient 1139 this AM, Beverly Marie noted to be prescribing provider. Patient instructed to call Beverly Marie office for blood thinner clarification. Patient verbalizes understanding and states he will call that office. Patient instructed to call this RN back should he need to.

## 2024-02-09 NOTE — TELEPHONE ENCOUNTER
Patient called and lvm that he is schedule for a biopsy 2/15/24 and would like directions on holding his plavix and Asprin.

## 2024-02-15 ENCOUNTER — HOSPITAL ENCOUNTER (INPATIENT)
Age: 69
LOS: 5 days | Discharge: HOME | DRG: 200 | End: 2024-02-20
Attending: STUDENT IN AN ORGANIZED HEALTH CARE EDUCATION/TRAINING PROGRAM | Admitting: STUDENT IN AN ORGANIZED HEALTH CARE EDUCATION/TRAINING PROGRAM
Payer: COMMERCIAL

## 2024-02-15 ENCOUNTER — HOSPITAL ENCOUNTER (OUTPATIENT)
Dept: GENERAL RADIOLOGY | Age: 69
Discharge: HOME | End: 2024-02-15
Payer: COMMERCIAL

## 2024-02-15 ENCOUNTER — HOSPITAL ENCOUNTER (OUTPATIENT)
Dept: CT IMAGING | Age: 69
Discharge: HOME | End: 2024-02-15
Payer: COMMERCIAL

## 2024-02-15 ENCOUNTER — HOSPITAL ENCOUNTER (OUTPATIENT)
Dept: GENERAL RADIOLOGY | Age: 69
DRG: 200 | End: 2024-02-15
Payer: COMMERCIAL

## 2024-02-15 ENCOUNTER — HOSPITAL ENCOUNTER (OUTPATIENT)
Dept: GENERAL RADIOLOGY | Age: 69
Discharge: HOME | End: 2024-02-15
Attending: STUDENT IN AN ORGANIZED HEALTH CARE EDUCATION/TRAINING PROGRAM
Payer: COMMERCIAL

## 2024-02-15 VITALS
RESPIRATION RATE: 20 BRPM | TEMPERATURE: 97.5 F | SYSTOLIC BLOOD PRESSURE: 110 MMHG | OXYGEN SATURATION: 98 % | HEART RATE: 69 BPM | DIASTOLIC BLOOD PRESSURE: 80 MMHG | HEIGHT: 69 IN | BODY MASS INDEX: 32.29 KG/M2 | WEIGHT: 218 LBS

## 2024-02-15 DIAGNOSIS — Z85.21 HISTORY OF LARYNGEAL CANCER: ICD-10-CM

## 2024-02-15 DIAGNOSIS — R91.1 LUNG NODULE: ICD-10-CM

## 2024-02-15 DIAGNOSIS — D73.89 LESION OF SPLEEN: ICD-10-CM

## 2024-02-15 PROBLEM — J93.9 PNEUMOTHORAX: Status: ACTIVE | Noted: 2024-02-15

## 2024-02-15 LAB
APTT: 30.6 SECONDS (ref 25.1–37.1)
BASOPHILS ABSOLUTE: 0.1 K/CU MM
BASOPHILS RELATIVE PERCENT: 0.9 % (ref 0–1)
DIFFERENTIAL TYPE: ABNORMAL
EOSINOPHILS ABSOLUTE: 0.4 K/CU MM
EOSINOPHILS RELATIVE PERCENT: 4.2 % (ref 0–3)
HCT VFR BLD CALC: 44.1 % (ref 42–52)
HCT VFR BLD CALC: 45.2 % (ref 42–52)
HEMOGLOBIN: 14.8 GM/DL (ref 13.5–18)
HEMOGLOBIN: 15 GM/DL (ref 13.5–18)
IMMATURE NEUTROPHIL %: 0.2 % (ref 0–0.43)
INR BLD: 1 INDEX
LYMPHOCYTES ABSOLUTE: 1.5 K/CU MM
LYMPHOCYTES RELATIVE PERCENT: 15.9 % (ref 24–44)
MAGNESIUM: 1.9 MG/DL (ref 1.8–2.4)
MCH RBC QN AUTO: 30.6 PG (ref 27–31)
MCH RBC QN AUTO: 31 PG (ref 27–31)
MCHC RBC AUTO-ENTMCNC: 33.2 % (ref 32–36)
MCHC RBC AUTO-ENTMCNC: 33.6 % (ref 32–36)
MCV RBC AUTO: 92.2 FL (ref 78–100)
MCV RBC AUTO: 92.3 FL (ref 78–100)
MONOCYTES ABSOLUTE: 0.6 K/CU MM
MONOCYTES RELATIVE PERCENT: 5.8 % (ref 0–4)
NUCLEATED RBC %: 0 %
PDW BLD-RTO: 13.2 % (ref 11.7–14.9)
PDW BLD-RTO: 13.3 % (ref 11.7–14.9)
PLATELET # BLD: 180 K/CU MM (ref 140–440)
PLATELET # BLD: 198 K/CU MM (ref 140–440)
PMV BLD AUTO: 9.1 FL (ref 7.5–11.1)
PMV BLD AUTO: 9.3 FL (ref 7.5–11.1)
PROTHROMBIN TIME: 13.2 SECONDS (ref 11.7–14.5)
RBC # BLD: 4.78 M/CU MM (ref 4.6–6.2)
RBC # BLD: 4.9 M/CU MM (ref 4.6–6.2)
SEGMENTED NEUTROPHILS ABSOLUTE COUNT: 7 K/CU MM
SEGMENTED NEUTROPHILS RELATIVE PERCENT: 73 % (ref 36–66)
TOTAL IMMATURE NEUTOROPHIL: 0.02 K/CU MM
TOTAL NUCLEATED RBC: 0 K/CU MM
WBC # BLD: 7.8 K/CU MM (ref 4–10.5)
WBC # BLD: 9.7 K/CU MM (ref 4–10.5)

## 2024-02-15 PROCEDURE — 80053 COMPREHEN METABOLIC PANEL: CPT

## 2024-02-15 PROCEDURE — 88334 PATH CONSLTJ SURG CYTO XM EA: CPT

## 2024-02-15 PROCEDURE — 88333 PATH CONSLTJ SURG CYTO XM 1: CPT

## 2024-02-15 PROCEDURE — 6360000002 HC RX W HCPCS: Performed by: STUDENT IN AN ORGANIZED HEALTH CARE EDUCATION/TRAINING PROGRAM

## 2024-02-15 PROCEDURE — 83735 ASSAY OF MAGNESIUM: CPT

## 2024-02-15 PROCEDURE — 71045 X-RAY EXAM CHEST 1 VIEW: CPT

## 2024-02-15 PROCEDURE — 32408 CORE NDL BX LNG/MED PERQ: CPT

## 2024-02-15 PROCEDURE — 85730 THROMBOPLASTIN TIME PARTIAL: CPT

## 2024-02-15 PROCEDURE — 88342 IMHCHEM/IMCYTCHM 1ST ANTB: CPT

## 2024-02-15 PROCEDURE — 94761 N-INVAS EAR/PLS OXIMETRY MLT: CPT

## 2024-02-15 PROCEDURE — 0BBG3ZX EXCISION OF LEFT UPPER LUNG LOBE, PERCUTANEOUS APPROACH, DIAGNOSTIC: ICD-10-PCS | Performed by: INTERNAL MEDICINE

## 2024-02-15 PROCEDURE — 2580000003 HC RX 258: Performed by: STUDENT IN AN ORGANIZED HEALTH CARE EDUCATION/TRAINING PROGRAM

## 2024-02-15 PROCEDURE — 6360000002 HC RX W HCPCS: Performed by: RADIOLOGY

## 2024-02-15 PROCEDURE — 85025 COMPLETE CBC W/AUTO DIFF WBC: CPT

## 2024-02-15 PROCEDURE — 85027 COMPLETE CBC AUTOMATED: CPT

## 2024-02-15 PROCEDURE — 85610 PROTHROMBIN TIME: CPT

## 2024-02-15 PROCEDURE — 6370000000 HC RX 637 (ALT 250 FOR IP): Performed by: STUDENT IN AN ORGANIZED HEALTH CARE EDUCATION/TRAINING PROGRAM

## 2024-02-15 PROCEDURE — 32551 INSERTION OF CHEST TUBE: CPT

## 2024-02-15 PROCEDURE — 88341 IMHCHEM/IMCYTCHM EA ADD ANTB: CPT

## 2024-02-15 PROCEDURE — 1200000000 HC SEMI PRIVATE

## 2024-02-15 PROCEDURE — 36415 COLL VENOUS BLD VENIPUNCTURE: CPT

## 2024-02-15 PROCEDURE — 0W9B30Z DRAINAGE OF LEFT PLEURAL CAVITY WITH DRAINAGE DEVICE, PERCUTANEOUS APPROACH: ICD-10-PCS | Performed by: INTERNAL MEDICINE

## 2024-02-15 PROCEDURE — 88305 TISSUE EXAM BY PATHOLOGIST: CPT

## 2024-02-15 PROCEDURE — 2500000003 HC RX 250 WO HCPCS: Performed by: RADIOLOGY

## 2024-02-15 RX ORDER — SPIRONOLACTONE 50 MG/1
25 TABLET, FILM COATED ORAL DAILY
Status: DISCONTINUED | OUTPATIENT
Start: 2024-02-16 | End: 2024-02-20 | Stop reason: HOSPADM

## 2024-02-15 RX ORDER — MAGNESIUM SULFATE IN WATER 40 MG/ML
2000 INJECTION, SOLUTION INTRAVENOUS PRN
Status: DISCONTINUED | OUTPATIENT
Start: 2024-02-15 | End: 2024-02-20 | Stop reason: HOSPADM

## 2024-02-15 RX ORDER — CLOPIDOGREL BISULFATE 75 MG/1
75 TABLET ORAL DAILY
Status: DISCONTINUED | OUTPATIENT
Start: 2024-02-15 | End: 2024-02-20 | Stop reason: HOSPADM

## 2024-02-15 RX ORDER — POTASSIUM CHLORIDE 20 MEQ/1
40 TABLET, EXTENDED RELEASE ORAL PRN
Status: DISCONTINUED | OUTPATIENT
Start: 2024-02-15 | End: 2024-02-20 | Stop reason: HOSPADM

## 2024-02-15 RX ORDER — SODIUM CHLORIDE 9 MG/ML
INJECTION, SOLUTION INTRAVENOUS PRN
Status: DISCONTINUED | OUTPATIENT
Start: 2024-02-15 | End: 2024-02-20 | Stop reason: HOSPADM

## 2024-02-15 RX ORDER — GABAPENTIN 400 MG/1
1600 CAPSULE ORAL DAILY
Status: DISCONTINUED | OUTPATIENT
Start: 2024-02-15 | End: 2024-02-20 | Stop reason: HOSPADM

## 2024-02-15 RX ORDER — POLYETHYLENE GLYCOL 3350 17 G/17G
17 POWDER, FOR SOLUTION ORAL DAILY PRN
Status: DISCONTINUED | OUTPATIENT
Start: 2024-02-15 | End: 2024-02-20 | Stop reason: HOSPADM

## 2024-02-15 RX ORDER — LISINOPRIL 5 MG/1
5 TABLET ORAL DAILY
Status: DISCONTINUED | OUTPATIENT
Start: 2024-02-16 | End: 2024-02-20 | Stop reason: HOSPADM

## 2024-02-15 RX ORDER — SODIUM CHLORIDE 0.9 % (FLUSH) 0.9 %
5-40 SYRINGE (ML) INJECTION EVERY 12 HOURS SCHEDULED
Status: DISCONTINUED | OUTPATIENT
Start: 2024-02-15 | End: 2024-02-20 | Stop reason: HOSPADM

## 2024-02-15 RX ORDER — SODIUM CHLORIDE 0.9 % (FLUSH) 0.9 %
5-40 SYRINGE (ML) INJECTION PRN
Status: DISCONTINUED | OUTPATIENT
Start: 2024-02-15 | End: 2024-02-20 | Stop reason: HOSPADM

## 2024-02-15 RX ORDER — FENTANYL CITRATE 50 UG/ML
INJECTION, SOLUTION INTRAMUSCULAR; INTRAVENOUS PRN
Status: DISCONTINUED | OUTPATIENT
Start: 2024-02-15 | End: 2024-02-16 | Stop reason: HOSPADM

## 2024-02-15 RX ORDER — ATORVASTATIN CALCIUM 40 MG/1
80 TABLET, FILM COATED ORAL NIGHTLY
Status: DISCONTINUED | OUTPATIENT
Start: 2024-02-15 | End: 2024-02-20 | Stop reason: HOSPADM

## 2024-02-15 RX ORDER — SODIUM CHLORIDE 0.9 % (FLUSH) 0.9 %
10 SYRINGE (ML) INJECTION PRN
Status: DISCONTINUED | OUTPATIENT
Start: 2024-02-15 | End: 2024-02-16 | Stop reason: HOSPADM

## 2024-02-15 RX ORDER — PANTOPRAZOLE SODIUM 40 MG/1
40 TABLET, DELAYED RELEASE ORAL
Status: DISCONTINUED | OUTPATIENT
Start: 2024-02-16 | End: 2024-02-20 | Stop reason: HOSPADM

## 2024-02-15 RX ORDER — ACETAMINOPHEN 650 MG/1
650 SUPPOSITORY RECTAL EVERY 6 HOURS PRN
Status: DISCONTINUED | OUTPATIENT
Start: 2024-02-15 | End: 2024-02-20 | Stop reason: HOSPADM

## 2024-02-15 RX ORDER — ONDANSETRON 2 MG/ML
4 INJECTION INTRAMUSCULAR; INTRAVENOUS EVERY 6 HOURS PRN
Status: DISCONTINUED | OUTPATIENT
Start: 2024-02-15 | End: 2024-02-20 | Stop reason: HOSPADM

## 2024-02-15 RX ORDER — ACETAMINOPHEN 325 MG/1
650 TABLET ORAL EVERY 6 HOURS PRN
Status: DISCONTINUED | OUTPATIENT
Start: 2024-02-15 | End: 2024-02-20 | Stop reason: HOSPADM

## 2024-02-15 RX ORDER — POTASSIUM CHLORIDE 7.45 MG/ML
10 INJECTION INTRAVENOUS PRN
Status: DISCONTINUED | OUTPATIENT
Start: 2024-02-15 | End: 2024-02-20 | Stop reason: HOSPADM

## 2024-02-15 RX ORDER — ONDANSETRON 4 MG/1
4 TABLET, ORALLY DISINTEGRATING ORAL EVERY 8 HOURS PRN
Status: DISCONTINUED | OUTPATIENT
Start: 2024-02-15 | End: 2024-02-20 | Stop reason: HOSPADM

## 2024-02-15 RX ORDER — LIDOCAINE HYDROCHLORIDE 10 MG/ML
INJECTION, SOLUTION EPIDURAL; INFILTRATION; INTRACAUDAL; PERINEURAL PRN
Status: DISCONTINUED | OUTPATIENT
Start: 2024-02-15 | End: 2024-02-16 | Stop reason: HOSPADM

## 2024-02-15 RX ORDER — ENOXAPARIN SODIUM 100 MG/ML
40 INJECTION SUBCUTANEOUS DAILY
Status: DISCONTINUED | OUTPATIENT
Start: 2024-02-15 | End: 2024-02-20 | Stop reason: HOSPADM

## 2024-02-15 RX ORDER — CARVEDILOL 6.25 MG/1
3.12 TABLET ORAL 2 TIMES DAILY
Status: DISCONTINUED | OUTPATIENT
Start: 2024-02-15 | End: 2024-02-20 | Stop reason: HOSPADM

## 2024-02-15 RX ORDER — MIDAZOLAM HYDROCHLORIDE 2 MG/2ML
INJECTION, SOLUTION INTRAMUSCULAR; INTRAVENOUS PRN
Status: DISCONTINUED | OUTPATIENT
Start: 2024-02-15 | End: 2024-02-16 | Stop reason: HOSPADM

## 2024-02-15 RX ORDER — ASPIRIN 81 MG/1
81 TABLET, CHEWABLE ORAL DAILY
Status: DISCONTINUED | OUTPATIENT
Start: 2024-02-16 | End: 2024-02-20 | Stop reason: HOSPADM

## 2024-02-15 RX ADMIN — CARVEDILOL 3.12 MG: 6.25 TABLET, FILM COATED ORAL at 21:12

## 2024-02-15 RX ADMIN — GABAPENTIN 1600 MG: 400 CAPSULE ORAL at 22:09

## 2024-02-15 RX ADMIN — FENTANYL CITRATE 50 MCG: 50 INJECTION, SOLUTION INTRAMUSCULAR; INTRAVENOUS at 10:36

## 2024-02-15 RX ADMIN — HYDROMORPHONE HYDROCHLORIDE 0.5 MG: 1 INJECTION, SOLUTION INTRAMUSCULAR; INTRAVENOUS; SUBCUTANEOUS at 16:26

## 2024-02-15 RX ADMIN — LIDOCAINE HYDROCHLORIDE 10 ML: 10 INJECTION, SOLUTION EPIDURAL; INFILTRATION; INTRACAUDAL; PERINEURAL at 09:50

## 2024-02-15 RX ADMIN — HYDROMORPHONE HYDROCHLORIDE 0.5 MG: 1 INJECTION, SOLUTION INTRAMUSCULAR; INTRAVENOUS; SUBCUTANEOUS at 21:12

## 2024-02-15 RX ADMIN — ACETAMINOPHEN 650 MG: 325 TABLET ORAL at 21:37

## 2024-02-15 RX ADMIN — FENTANYL CITRATE 50 MCG: 50 INJECTION, SOLUTION INTRAMUSCULAR; INTRAVENOUS at 09:39

## 2024-02-15 RX ADMIN — MIDAZOLAM HYDROCHLORIDE 1 MG: 1 INJECTION, SOLUTION INTRAMUSCULAR; INTRAVENOUS at 09:39

## 2024-02-15 RX ADMIN — SODIUM CHLORIDE, PRESERVATIVE FREE 10 ML: 5 INJECTION INTRAVENOUS at 21:12

## 2024-02-15 RX ADMIN — CLOPIDOGREL BISULFATE 75 MG: 75 TABLET ORAL at 21:22

## 2024-02-15 ASSESSMENT — PAIN DESCRIPTION - ONSET
ONSET: ON-GOING
ONSET: ON-GOING

## 2024-02-15 ASSESSMENT — PAIN DESCRIPTION - DESCRIPTORS
DESCRIPTORS: STABBING;PRESSURE
DESCRIPTORS: PRESSURE
DESCRIPTORS: ACHING;SHARP

## 2024-02-15 ASSESSMENT — PAIN DESCRIPTION - PAIN TYPE
TYPE: ACUTE PAIN
TYPE: SURGICAL PAIN

## 2024-02-15 ASSESSMENT — PAIN DESCRIPTION - LOCATION
LOCATION: CHEST

## 2024-02-15 ASSESSMENT — PAIN SCALES - GENERAL
PAINLEVEL_OUTOF10: 8
PAINLEVEL_OUTOF10: 0
PAINLEVEL_OUTOF10: 8
PAINLEVEL_OUTOF10: 8

## 2024-02-15 ASSESSMENT — PAIN DESCRIPTION - FREQUENCY
FREQUENCY: CONTINUOUS
FREQUENCY: CONTINUOUS

## 2024-02-15 ASSESSMENT — PAIN DESCRIPTION - ORIENTATION
ORIENTATION: LEFT
ORIENTATION: LEFT

## 2024-02-15 ASSESSMENT — PAIN - FUNCTIONAL ASSESSMENT
PAIN_FUNCTIONAL_ASSESSMENT: ACTIVITIES ARE NOT PREVENTED
PAIN_FUNCTIONAL_ASSESSMENT: ACTIVITIES ARE NOT PREVENTED

## 2024-02-15 NOTE — PROGRESS NOTES
Patient returned to [facility].  Chest tube to continuous suction.  Air leak present.  RT at bedside for trach collar.

## 2024-02-15 NOTE — H&P
V2.0  History and Physical      Name:  Brayan Galeana /Age/Sex: 1955  (68 y.o. male)   MRN & CSN:  [id number] & [id number] Encounter Date/Time: 2/15/2024 12:58 PM EST   [facility] PCP: Beverly Marie APRN - NP            Assessment and Plan:   Brayan Galeana is a 68 y.o. male with a pmh of HTN, hyperlipidemia, hypothyroidism, CAD, CHF, s/p pacemaker, history of laryngeal cancer, s/p laryngectomy in  followed by adjuvant radiation therapy  who presents with Pneumothorax    Hospital Problems             Last Modified POA    * (Principal) Pneumothorax 2/15/2024 Yes       Plan:  Acute sided pneumothorax status post lung biopsy:  CT soft tissue neck on 23  for laryngeal ca f/uand it showed spiculated 1.9 cm partially visualized subpleural nodule in left upper lobe.    Subsequently had CT chest with contrast on 23 and it showed  1.5 x 1.7 cm peripheral left upper lobe nodule noted with a small peripheral lucency. 8mm hyperdense area of nodularity noted in the spleen. . Lung bx was planned per onc and c/b ptx  -Admit to MedSurg  -Telemetry  -Closely monitor respiratory symptoms  -Patient status post chest tube placement per IR  -Consult pulmonologist  -Serial chest x-rays-small left apical ptc, stable on serial imaging  -Follow basic labs    Hyperlipidemia-atorvastatin  GERD-Omeprazole  Hypothyroidism-levothyroxine  COPD-not in acute exacerbation, continue home meds  HTN-lisinopril and Coreg  History of malignant neoplasm of the larynx-status post laryngectomy   History of CAD:  History of CHF-continue with ASA, Plavix, carvedilol, spironolactone and atorvastatin    Disposition:   Current Living situation: Home  Expected Disposition: Home  Estimated D/C: 1-2 days    Diet ADULT DIET; Regular; Low Fat/Low Chol/High Fiber/2 gm Na   DVT Prophylaxis [x] Lovenox, []  Heparin, [] SCDs, [] Ambulation,  [] Eliquis, [] Xarelto, [] Coumadin   Code Status Full Code   Surrogate Decision

## 2024-02-15 NOTE — PROGRESS NOTES
Pt has clean and ready bed 3010, report given to bedside RN assuming care.  PT has lunch ordered will transport pt after he has eaten lunch.    Pt has family at bedside, resting comfortably in bed with call light in reach

## 2024-02-16 ENCOUNTER — APPOINTMENT (OUTPATIENT)
Dept: GENERAL RADIOLOGY | Age: 69
DRG: 200 | End: 2024-02-16
Attending: STUDENT IN AN ORGANIZED HEALTH CARE EDUCATION/TRAINING PROGRAM
Payer: COMMERCIAL

## 2024-02-16 LAB
ALBUMIN SERPL-MCNC: 4.2 GM/DL (ref 3.4–5)
ALP BLD-CCNC: 101 IU/L (ref 40–128)
ALT SERPL-CCNC: 14 U/L (ref 10–40)
ANION GAP SERPL CALCULATED.3IONS-SCNC: 8 MMOL/L (ref 7–16)
AST SERPL-CCNC: 14 IU/L (ref 15–37)
BASOPHILS ABSOLUTE: 0.1 K/CU MM
BASOPHILS RELATIVE PERCENT: 0.8 % (ref 0–1)
BILIRUB SERPL-MCNC: 1.1 MG/DL (ref 0–1)
BUN SERPL-MCNC: 17 MG/DL (ref 6–23)
CALCIUM SERPL-MCNC: 8.5 MG/DL (ref 8.3–10.6)
CHLORIDE BLD-SCNC: 102 MMOL/L (ref 99–110)
CO2: 27 MMOL/L (ref 21–32)
CREAT SERPL-MCNC: 0.8 MG/DL (ref 0.9–1.3)
DIFFERENTIAL TYPE: ABNORMAL
EOSINOPHILS ABSOLUTE: 0.4 K/CU MM
EOSINOPHILS RELATIVE PERCENT: 4.5 % (ref 0–3)
GFR SERPL CREATININE-BSD FRML MDRD: >60 ML/MIN/1.73M2
GLUCOSE SERPL-MCNC: 124 MG/DL (ref 70–99)
HCT VFR BLD CALC: 43.4 % (ref 42–52)
HEMOGLOBIN: 14.3 GM/DL (ref 13.5–18)
IMMATURE NEUTROPHIL %: 0.3 % (ref 0–0.43)
LYMPHOCYTES ABSOLUTE: 1.4 K/CU MM
LYMPHOCYTES RELATIVE PERCENT: 14.9 % (ref 24–44)
MCH RBC QN AUTO: 31.2 PG (ref 27–31)
MCHC RBC AUTO-ENTMCNC: 32.9 % (ref 32–36)
MCV RBC AUTO: 94.6 FL (ref 78–100)
MONOCYTES ABSOLUTE: 0.6 K/CU MM
MONOCYTES RELATIVE PERCENT: 6.5 % (ref 0–4)
NUCLEATED RBC %: 0 %
PDW BLD-RTO: 13.6 % (ref 11.7–14.9)
PLATELET # BLD: 174 K/CU MM (ref 140–440)
PMV BLD AUTO: 9.1 FL (ref 7.5–11.1)
POTASSIUM SERPL-SCNC: 4.2 MMOL/L (ref 3.5–5.1)
RBC # BLD: 4.59 M/CU MM (ref 4.6–6.2)
SEGMENTED NEUTROPHILS ABSOLUTE COUNT: 6.7 K/CU MM
SEGMENTED NEUTROPHILS RELATIVE PERCENT: 73 % (ref 36–66)
SODIUM BLD-SCNC: 137 MMOL/L (ref 135–145)
TOTAL IMMATURE NEUTOROPHIL: 0.03 K/CU MM
TOTAL NUCLEATED RBC: 0 K/CU MM
TOTAL PROTEIN: 6.2 GM/DL (ref 6.4–8.2)
WBC # BLD: 9.2 K/CU MM (ref 4–10.5)

## 2024-02-16 PROCEDURE — 71045 X-RAY EXAM CHEST 1 VIEW: CPT

## 2024-02-16 PROCEDURE — 94761 N-INVAS EAR/PLS OXIMETRY MLT: CPT

## 2024-02-16 PROCEDURE — 6370000000 HC RX 637 (ALT 250 FOR IP): Performed by: STUDENT IN AN ORGANIZED HEALTH CARE EDUCATION/TRAINING PROGRAM

## 2024-02-16 PROCEDURE — 1200000000 HC SEMI PRIVATE

## 2024-02-16 PROCEDURE — 85025 COMPLETE CBC W/AUTO DIFF WBC: CPT

## 2024-02-16 PROCEDURE — 36415 COLL VENOUS BLD VENIPUNCTURE: CPT

## 2024-02-16 PROCEDURE — 6360000002 HC RX W HCPCS: Performed by: STUDENT IN AN ORGANIZED HEALTH CARE EDUCATION/TRAINING PROGRAM

## 2024-02-16 PROCEDURE — 80053 COMPREHEN METABOLIC PANEL: CPT

## 2024-02-16 PROCEDURE — 2580000003 HC RX 258: Performed by: STUDENT IN AN ORGANIZED HEALTH CARE EDUCATION/TRAINING PROGRAM

## 2024-02-16 PROCEDURE — 2700000000 HC OXYGEN THERAPY PER DAY

## 2024-02-16 RX ORDER — LIDOCAINE 4 G/G
1 PATCH TOPICAL DAILY
Status: DISCONTINUED | OUTPATIENT
Start: 2024-02-16 | End: 2024-02-20 | Stop reason: HOSPADM

## 2024-02-16 RX ORDER — IPRATROPIUM BROMIDE AND ALBUTEROL SULFATE 2.5; .5 MG/3ML; MG/3ML
1 SOLUTION RESPIRATORY (INHALATION)
Status: DISCONTINUED | OUTPATIENT
Start: 2024-02-16 | End: 2024-02-19

## 2024-02-16 RX ORDER — HYDROCODONE BITARTRATE AND ACETAMINOPHEN 7.5; 325 MG/1; MG/1
1 TABLET ORAL EVERY 6 HOURS PRN
Status: DISCONTINUED | OUTPATIENT
Start: 2024-02-16 | End: 2024-02-20 | Stop reason: HOSPADM

## 2024-02-16 RX ADMIN — SODIUM CHLORIDE, PRESERVATIVE FREE 10 ML: 5 INJECTION INTRAVENOUS at 21:48

## 2024-02-16 RX ADMIN — ASPIRIN 81 MG: 81 TABLET, CHEWABLE ORAL at 08:22

## 2024-02-16 RX ADMIN — SODIUM CHLORIDE, PRESERVATIVE FREE 5 ML: 5 INJECTION INTRAVENOUS at 08:22

## 2024-02-16 RX ADMIN — ACETAMINOPHEN 650 MG: 325 TABLET ORAL at 18:13

## 2024-02-16 RX ADMIN — HYDROMORPHONE HYDROCHLORIDE 0.5 MG: 1 INJECTION, SOLUTION INTRAMUSCULAR; INTRAVENOUS; SUBCUTANEOUS at 01:27

## 2024-02-16 RX ADMIN — HYDROCODONE BITARTRATE AND ACETAMINOPHEN 1 TABLET: 7.5; 325 TABLET ORAL at 18:12

## 2024-02-16 RX ADMIN — HYDROMORPHONE HYDROCHLORIDE 0.5 MG: 1 INJECTION, SOLUTION INTRAMUSCULAR; INTRAVENOUS; SUBCUTANEOUS at 21:36

## 2024-02-16 RX ADMIN — CARVEDILOL 3.12 MG: 6.25 TABLET, FILM COATED ORAL at 21:45

## 2024-02-16 RX ADMIN — HYDROCODONE BITARTRATE AND ACETAMINOPHEN 1 TABLET: 7.5; 325 TABLET ORAL at 10:22

## 2024-02-16 RX ADMIN — ENOXAPARIN SODIUM 40 MG: 100 INJECTION SUBCUTANEOUS at 08:23

## 2024-02-16 RX ADMIN — HYDROMORPHONE HYDROCHLORIDE 0.5 MG: 1 INJECTION, SOLUTION INTRAMUSCULAR; INTRAVENOUS; SUBCUTANEOUS at 05:28

## 2024-02-16 RX ADMIN — LISINOPRIL 5 MG: 5 TABLET ORAL at 08:22

## 2024-02-16 RX ADMIN — CLOPIDOGREL BISULFATE 75 MG: 75 TABLET ORAL at 08:22

## 2024-02-16 RX ADMIN — ATORVASTATIN CALCIUM 80 MG: 40 TABLET, FILM COATED ORAL at 21:45

## 2024-02-16 RX ADMIN — HYDROMORPHONE HYDROCHLORIDE 0.5 MG: 1 INJECTION, SOLUTION INTRAMUSCULAR; INTRAVENOUS; SUBCUTANEOUS at 14:27

## 2024-02-16 RX ADMIN — CARVEDILOL 3.12 MG: 6.25 TABLET, FILM COATED ORAL at 08:22

## 2024-02-16 RX ADMIN — LEVOTHYROXINE SODIUM 137 MCG: 25 TABLET ORAL at 05:28

## 2024-02-16 RX ADMIN — ACETAMINOPHEN 650 MG: 325 TABLET ORAL at 05:28

## 2024-02-16 RX ADMIN — SPIRONOLACTONE 25 MG: 50 TABLET ORAL at 08:22

## 2024-02-16 RX ADMIN — PANTOPRAZOLE SODIUM 40 MG: 40 TABLET, DELAYED RELEASE ORAL at 05:28

## 2024-02-16 ASSESSMENT — PAIN DESCRIPTION - DESCRIPTORS
DESCRIPTORS: SHARP;ACHING
DESCRIPTORS: ACHING;THROBBING
DESCRIPTORS: SHARP
DESCRIPTORS: ACHING
DESCRIPTORS: SHARP

## 2024-02-16 ASSESSMENT — PAIN - FUNCTIONAL ASSESSMENT
PAIN_FUNCTIONAL_ASSESSMENT: PREVENTS OR INTERFERES SOME ACTIVE ACTIVITIES AND ADLS
PAIN_FUNCTIONAL_ASSESSMENT: ACTIVITIES ARE NOT PREVENTED

## 2024-02-16 ASSESSMENT — PAIN SCALES - GENERAL
PAINLEVEL_OUTOF10: 9
PAINLEVEL_OUTOF10: 8
PAINLEVEL_OUTOF10: 7
PAINLEVEL_OUTOF10: 8
PAINLEVEL_OUTOF10: 7
PAINLEVEL_OUTOF10: 7
PAINLEVEL_OUTOF10: 8

## 2024-02-16 ASSESSMENT — PAIN DESCRIPTION - ONSET
ONSET: ON-GOING
ONSET: PROGRESSIVE

## 2024-02-16 ASSESSMENT — PAIN DESCRIPTION - ORIENTATION
ORIENTATION: LEFT

## 2024-02-16 ASSESSMENT — PAIN DESCRIPTION - PAIN TYPE
TYPE: SURGICAL PAIN
TYPE: SURGICAL PAIN
TYPE: ACUTE PAIN
TYPE: SURGICAL PAIN

## 2024-02-16 ASSESSMENT — PAIN DESCRIPTION - FREQUENCY
FREQUENCY: CONTINUOUS

## 2024-02-16 ASSESSMENT — PAIN DESCRIPTION - LOCATION
LOCATION: CHEST

## 2024-02-16 NOTE — CARE COORDINATION
SW Student attempted to speak with pt about discharge planning. However, due to communication barriers unable to complete assessment. Pt lives with bharath in 1-story house. Per pt chart, pt has PCP and insurance. No needs identified at this time, CM available as needed.

## 2024-02-16 NOTE — CONSULTS
[address] [address] [place] [address]                                  CONSULTATION    PATIENT NAME: JACQUELYN ARANDA                    :        1955  MED REC NO:   [id number]                          ROOM:       3010  ACCOUNT NO:   [id number]                           ADMIT DATE: 02/15/2024  PROVIDER:     Ruperto Johansen MD    CONSULT DATE:  2024    HISTORY OF PRESENT ILLNESS:  The patient is a 68-year-old gentleman with  multiple medical problems including hypertension; hyperlipidemia;  hypothyroidism; coronary artery disease; congestive heart failure;  history of laryngeal cancer, status post laryngectomy in  who was  recently found to have a 1.9-cm mass in the left upper lobe.  The  patient underwent transthoracic needle aspiration and subsequently  developed a pneumothorax.  The patient required chest tube and was  subsequently admitted to the hospital.  He denies any hemoptysis.  He  denies any fevers or chills.  He denies any nausea or vomiting.  The  patient still has air leak through the chest tube.  His lung has  re-expanded and there is a trace apical pneumothorax on the left side.   He is requiring oxygen and the patient is not on home oxygen.    PAST MEDICAL HISTORY:  Significant for COPD, congestive heart failure,  hypertension, hyperlipidemia, hypothyroidism, CVA, throat cancer.    PAST SURGICAL HISTORY:  Remarkable for ankle surgery, appendectomy,  needle biopsy of the lung, pacemaker placement, tracheostomy.    FAMILY HISTORY:  Reveals that his mother had diabetes, father had cancer  and heart disease.    SOCIAL HISTORY:  Reveals that he quit smoking in , but has history  of heavy smoking with 84 pack-year smoking.  No history of alcohol or  drug abuse.    MEDICATIONS:  Reviewed.    ALLERGIES:  He has no known allergies.    REVIEW OF SYSTEMS:  A 10 to 14-point review of systems were reviewed and  are

## 2024-02-17 ENCOUNTER — APPOINTMENT (OUTPATIENT)
Dept: GENERAL RADIOLOGY | Age: 69
DRG: 200 | End: 2024-02-17
Attending: STUDENT IN AN ORGANIZED HEALTH CARE EDUCATION/TRAINING PROGRAM
Payer: COMMERCIAL

## 2024-02-17 LAB
ALBUMIN SERPL-MCNC: 4.2 GM/DL (ref 3.4–5)
ALP BLD-CCNC: 99 IU/L (ref 40–128)
ALT SERPL-CCNC: 13 U/L (ref 10–40)
ANION GAP SERPL CALCULATED.3IONS-SCNC: 11 MMOL/L (ref 7–16)
AST SERPL-CCNC: 15 IU/L (ref 15–37)
BASOPHILS ABSOLUTE: 0.1 K/CU MM
BASOPHILS RELATIVE PERCENT: 0.5 % (ref 0–1)
BILIRUB SERPL-MCNC: 0.9 MG/DL (ref 0–1)
BUN SERPL-MCNC: 16 MG/DL (ref 6–23)
CALCIUM SERPL-MCNC: 8.5 MG/DL (ref 8.3–10.6)
CHLORIDE BLD-SCNC: 99 MMOL/L (ref 99–110)
CO2: 25 MMOL/L (ref 21–32)
CREAT SERPL-MCNC: 0.8 MG/DL (ref 0.9–1.3)
DIFFERENTIAL TYPE: ABNORMAL
EOSINOPHILS ABSOLUTE: 0.4 K/CU MM
EOSINOPHILS RELATIVE PERCENT: 3.8 % (ref 0–3)
GFR SERPL CREATININE-BSD FRML MDRD: >60 ML/MIN/1.73M2
GLUCOSE SERPL-MCNC: 140 MG/DL (ref 70–99)
HCT VFR BLD CALC: 43.2 % (ref 42–52)
HEMOGLOBIN: 14 GM/DL (ref 13.5–18)
IMMATURE NEUTROPHIL %: 0.3 % (ref 0–0.43)
LYMPHOCYTES ABSOLUTE: 1 K/CU MM
LYMPHOCYTES RELATIVE PERCENT: 10.7 % (ref 24–44)
MAGNESIUM: 2 MG/DL (ref 1.8–2.4)
MCH RBC QN AUTO: 30.8 PG (ref 27–31)
MCHC RBC AUTO-ENTMCNC: 32.4 % (ref 32–36)
MCV RBC AUTO: 95.2 FL (ref 78–100)
MONOCYTES ABSOLUTE: 0.6 K/CU MM
MONOCYTES RELATIVE PERCENT: 5.7 % (ref 0–4)
NUCLEATED RBC %: 0 %
PDW BLD-RTO: 13.4 % (ref 11.7–14.9)
PHOSPHORUS: 2.5 MG/DL (ref 2.5–4.9)
PLATELET # BLD: 164 K/CU MM (ref 140–440)
PMV BLD AUTO: 9.1 FL (ref 7.5–11.1)
POTASSIUM SERPL-SCNC: 4.2 MMOL/L (ref 3.5–5.1)
PROCALCITONIN SERPL-MCNC: 0.05 NG/ML
RBC # BLD: 4.54 M/CU MM (ref 4.6–6.2)
SEGMENTED NEUTROPHILS ABSOLUTE COUNT: 7.7 K/CU MM
SEGMENTED NEUTROPHILS RELATIVE PERCENT: 79 % (ref 36–66)
SODIUM BLD-SCNC: 135 MMOL/L (ref 135–145)
TOTAL IMMATURE NEUTOROPHIL: 0.03 K/CU MM
TOTAL NUCLEATED RBC: 0 K/CU MM
TOTAL PROTEIN: 6.2 GM/DL (ref 6.4–8.2)
WBC # BLD: 9.8 K/CU MM (ref 4–10.5)

## 2024-02-17 PROCEDURE — 71045 X-RAY EXAM CHEST 1 VIEW: CPT

## 2024-02-17 PROCEDURE — 2700000000 HC OXYGEN THERAPY PER DAY

## 2024-02-17 PROCEDURE — 80053 COMPREHEN METABOLIC PANEL: CPT

## 2024-02-17 PROCEDURE — 84100 ASSAY OF PHOSPHORUS: CPT

## 2024-02-17 PROCEDURE — 85025 COMPLETE CBC W/AUTO DIFF WBC: CPT

## 2024-02-17 PROCEDURE — 6370000000 HC RX 637 (ALT 250 FOR IP): Performed by: STUDENT IN AN ORGANIZED HEALTH CARE EDUCATION/TRAINING PROGRAM

## 2024-02-17 PROCEDURE — 36415 COLL VENOUS BLD VENIPUNCTURE: CPT

## 2024-02-17 PROCEDURE — 1200000000 HC SEMI PRIVATE

## 2024-02-17 PROCEDURE — 84145 PROCALCITONIN (PCT): CPT

## 2024-02-17 PROCEDURE — 2580000003 HC RX 258: Performed by: STUDENT IN AN ORGANIZED HEALTH CARE EDUCATION/TRAINING PROGRAM

## 2024-02-17 PROCEDURE — 94761 N-INVAS EAR/PLS OXIMETRY MLT: CPT

## 2024-02-17 PROCEDURE — 83735 ASSAY OF MAGNESIUM: CPT

## 2024-02-17 PROCEDURE — 94640 AIRWAY INHALATION TREATMENT: CPT

## 2024-02-17 PROCEDURE — 6360000002 HC RX W HCPCS: Performed by: STUDENT IN AN ORGANIZED HEALTH CARE EDUCATION/TRAINING PROGRAM

## 2024-02-17 PROCEDURE — 6360000002 HC RX W HCPCS: Performed by: NURSE PRACTITIONER

## 2024-02-17 PROCEDURE — 6370000000 HC RX 637 (ALT 250 FOR IP): Performed by: INTERNAL MEDICINE

## 2024-02-17 RX ORDER — DIPHENHYDRAMINE HYDROCHLORIDE 50 MG/ML
25 INJECTION INTRAMUSCULAR; INTRAVENOUS ONCE
Status: COMPLETED | OUTPATIENT
Start: 2024-02-17 | End: 2024-02-17

## 2024-02-17 RX ADMIN — HYDROMORPHONE HYDROCHLORIDE 0.5 MG: 1 INJECTION, SOLUTION INTRAMUSCULAR; INTRAVENOUS; SUBCUTANEOUS at 12:04

## 2024-02-17 RX ADMIN — ENOXAPARIN SODIUM 40 MG: 100 INJECTION SUBCUTANEOUS at 09:06

## 2024-02-17 RX ADMIN — IPRATROPIUM BROMIDE AND ALBUTEROL SULFATE 1 DOSE: 2.5; .5 SOLUTION RESPIRATORY (INHALATION) at 00:11

## 2024-02-17 RX ADMIN — SODIUM CHLORIDE, PRESERVATIVE FREE 10 ML: 5 INJECTION INTRAVENOUS at 21:18

## 2024-02-17 RX ADMIN — ASPIRIN 81 MG: 81 TABLET, CHEWABLE ORAL at 09:06

## 2024-02-17 RX ADMIN — HYDROMORPHONE HYDROCHLORIDE 0.5 MG: 1 INJECTION, SOLUTION INTRAMUSCULAR; INTRAVENOUS; SUBCUTANEOUS at 02:45

## 2024-02-17 RX ADMIN — CLOPIDOGREL BISULFATE 75 MG: 75 TABLET ORAL at 09:05

## 2024-02-17 RX ADMIN — HYDROCODONE BITARTRATE AND ACETAMINOPHEN 1 TABLET: 7.5; 325 TABLET ORAL at 00:39

## 2024-02-17 RX ADMIN — CARVEDILOL 3.12 MG: 6.25 TABLET, FILM COATED ORAL at 20:46

## 2024-02-17 RX ADMIN — DIPHENHYDRAMINE HYDROCHLORIDE 25 MG: 50 INJECTION, SOLUTION INTRAMUSCULAR; INTRAVENOUS at 21:18

## 2024-02-17 RX ADMIN — PANTOPRAZOLE SODIUM 40 MG: 40 TABLET, DELAYED RELEASE ORAL at 06:01

## 2024-02-17 RX ADMIN — CARVEDILOL 3.12 MG: 6.25 TABLET, FILM COATED ORAL at 09:05

## 2024-02-17 RX ADMIN — HYDROMORPHONE HYDROCHLORIDE 0.5 MG: 1 INJECTION, SOLUTION INTRAMUSCULAR; INTRAVENOUS; SUBCUTANEOUS at 18:12

## 2024-02-17 RX ADMIN — HYDROMORPHONE HYDROCHLORIDE 0.5 MG: 1 INJECTION, SOLUTION INTRAMUSCULAR; INTRAVENOUS; SUBCUTANEOUS at 06:33

## 2024-02-17 RX ADMIN — IPRATROPIUM BROMIDE AND ALBUTEROL SULFATE 1 DOSE: 2.5; .5 SOLUTION RESPIRATORY (INHALATION) at 10:57

## 2024-02-17 RX ADMIN — ACETAMINOPHEN 650 MG: 325 TABLET ORAL at 12:09

## 2024-02-17 RX ADMIN — HYDROCODONE BITARTRATE AND ACETAMINOPHEN 1 TABLET: 7.5; 325 TABLET ORAL at 09:05

## 2024-02-17 RX ADMIN — IPRATROPIUM BROMIDE AND ALBUTEROL SULFATE 1 DOSE: 2.5; .5 SOLUTION RESPIRATORY (INHALATION) at 07:06

## 2024-02-17 RX ADMIN — LISINOPRIL 5 MG: 5 TABLET ORAL at 09:05

## 2024-02-17 RX ADMIN — SODIUM CHLORIDE, PRESERVATIVE FREE 10 ML: 5 INJECTION INTRAVENOUS at 09:09

## 2024-02-17 RX ADMIN — HYDROCODONE BITARTRATE AND ACETAMINOPHEN 1 TABLET: 7.5; 325 TABLET ORAL at 15:19

## 2024-02-17 RX ADMIN — GABAPENTIN 1600 MG: 400 CAPSULE ORAL at 15:19

## 2024-02-17 RX ADMIN — ATORVASTATIN CALCIUM 80 MG: 40 TABLET, FILM COATED ORAL at 20:46

## 2024-02-17 RX ADMIN — SPIRONOLACTONE 25 MG: 50 TABLET ORAL at 09:05

## 2024-02-17 RX ADMIN — LEVOTHYROXINE SODIUM 137 MCG: 25 TABLET ORAL at 06:01

## 2024-02-17 ASSESSMENT — PAIN - FUNCTIONAL ASSESSMENT
PAIN_FUNCTIONAL_ASSESSMENT: PREVENTS OR INTERFERES SOME ACTIVE ACTIVITIES AND ADLS

## 2024-02-17 ASSESSMENT — PAIN DESCRIPTION - DESCRIPTORS
DESCRIPTORS: ACHING

## 2024-02-17 ASSESSMENT — PAIN DESCRIPTION - LOCATION
LOCATION: ABDOMEN
LOCATION: ABDOMEN
LOCATION: CHEST
LOCATION: CHEST
LOCATION: ABDOMEN
LOCATION: CHEST
LOCATION: ABDOMEN

## 2024-02-17 ASSESSMENT — PAIN SCALES - GENERAL
PAINLEVEL_OUTOF10: 4
PAINLEVEL_OUTOF10: 5
PAINLEVEL_OUTOF10: 4
PAINLEVEL_OUTOF10: 7
PAINLEVEL_OUTOF10: 5
PAINLEVEL_OUTOF10: 7
PAINLEVEL_OUTOF10: 5
PAINLEVEL_OUTOF10: 10
PAINLEVEL_OUTOF10: 5
PAINLEVEL_OUTOF10: 6
PAINLEVEL_OUTOF10: 5
PAINLEVEL_OUTOF10: 5
PAINLEVEL_OUTOF10: 7
PAINLEVEL_OUTOF10: 5

## 2024-02-17 ASSESSMENT — PAIN DESCRIPTION - ONSET: ONSET: ON-GOING

## 2024-02-17 ASSESSMENT — PAIN DESCRIPTION - ORIENTATION
ORIENTATION: LEFT

## 2024-02-17 ASSESSMENT — PAIN DESCRIPTION - FREQUENCY: FREQUENCY: CONTINUOUS

## 2024-02-17 ASSESSMENT — PAIN DESCRIPTION - PAIN TYPE: TYPE: SURGICAL PAIN

## 2024-02-17 NOTE — PLAN OF CARE
Problem: Discharge Planning  Goal: Discharge to home or other facility with appropriate resources  2/17/2024 0329 by Toro Healy RN  Outcome: Progressing  2/16/2024 2000 by Paul Horner RN  Outcome: Progressing     Problem: Pain  Goal: Verbalizes/displays adequate comfort level or baseline comfort level  2/17/2024 0329 by Toro Healy RN  Outcome: Progressing  2/16/2024 2000 by Paul Horner RN  Outcome: Progressing  Flowsheets  Taken 2/16/2024 1426  Verbalizes/displays adequate comfort level or baseline comfort level:   Assess pain using appropriate pain scale   Encourage patient to monitor pain and request assistance  Taken 2/16/2024 1020  Verbalizes/displays adequate comfort level or baseline comfort level:   Assess pain using appropriate pain scale   Encourage patient to monitor pain and request assistance  Taken 2/16/2024 0754  Verbalizes/displays adequate comfort level or baseline comfort level:   Assess pain using appropriate pain scale   Encourage patient to monitor pain and request assistance     Problem: Safety - Adult  Goal: Free from fall injury  2/17/2024 0329 by Toro Healy RN  Outcome: Progressing  2/16/2024 2000 by Paul Horner RN  Outcome: Progressing     Problem: Chronic Conditions and Co-morbidities  Goal: Patient's chronic conditions and co-morbidity symptoms are monitored and maintained or improved  2/17/2024 0329 by Toro Healy RN  Outcome: Progressing  2/16/2024 2000 by Paul Horner RN  Outcome: Progressing

## 2024-02-17 NOTE — PLAN OF CARE
Problem: Pain  Goal: Verbalizes/displays adequate comfort level or baseline comfort level  2/17/2024 1621 by Berenice Schroeder RN  Outcome: Progressing  2/17/2024 0329 by Toro Healy RN  Outcome: Progressing     Problem: Safety - Adult  Goal: Free from fall injury  2/17/2024 1621 by Berenice Schroeder RN  Outcome: Progressing  2/17/2024 0329 by Toro Healy RN  Outcome: Progressing     Problem: Discharge Planning  Goal: Discharge to home or other facility with appropriate resources  2/17/2024 1621 by Berenice Schroeder RN  Outcome: Progressing  2/17/2024 0329 by Toro Healy RN  Outcome: Progressing     Problem: Discharge Planning  Goal: Discharge to home or other facility with appropriate resources  2/17/2024 1621 by Berenice Schroeder RN  Outcome: Progressing  2/17/2024 0329 by Toro Healy RN  Outcome: Progressing     Problem: Safety - Adult  Goal: Free from fall injury  2/17/2024 1621 by Berenice Schroeder RN  Outcome: Progressing  2/17/2024 0329 by Toro Healy RN  Outcome: Progressing     Problem: Chronic Conditions and Co-morbidities  Goal: Patient's chronic conditions and co-morbidity symptoms are monitored and maintained or improved  2/17/2024 1621 by Berenice Schroeder RN  Outcome: Progressing  2/17/2024 0329 by Toro Healy RN  Outcome: Progressing

## 2024-02-17 NOTE — PLAN OF CARE
Problem: Discharge Planning  Goal: Discharge to home or other facility with appropriate resources  Outcome: Progressing     Problem: Pain  Goal: Verbalizes/displays adequate comfort level or baseline comfort level  Outcome: Progressing  Flowsheets  Taken 2/16/2024 1426  Verbalizes/displays adequate comfort level or baseline comfort level:   Assess pain using appropriate pain scale   Encourage patient to monitor pain and request assistance  Taken 2/16/2024 1020  Verbalizes/displays adequate comfort level or baseline comfort level:   Assess pain using appropriate pain scale   Encourage patient to monitor pain and request assistance  Taken 2/16/2024 0754  Verbalizes/displays adequate comfort level or baseline comfort level:   Assess pain using appropriate pain scale   Encourage patient to monitor pain and request assistance     Problem: Safety - Adult  Goal: Free from fall injury  Outcome: Progressing     Problem: Chronic Conditions and Co-morbidities  Goal: Patient's chronic conditions and co-morbidity symptoms are monitored and maintained or improved  Outcome: Progressing

## 2024-02-18 ENCOUNTER — APPOINTMENT (OUTPATIENT)
Dept: GENERAL RADIOLOGY | Age: 69
DRG: 200 | End: 2024-02-18
Attending: INTERNAL MEDICINE
Payer: COMMERCIAL

## 2024-02-18 PROCEDURE — 6360000002 HC RX W HCPCS: Performed by: STUDENT IN AN ORGANIZED HEALTH CARE EDUCATION/TRAINING PROGRAM

## 2024-02-18 PROCEDURE — 94640 AIRWAY INHALATION TREATMENT: CPT

## 2024-02-18 PROCEDURE — 2700000000 HC OXYGEN THERAPY PER DAY

## 2024-02-18 PROCEDURE — 71045 X-RAY EXAM CHEST 1 VIEW: CPT

## 2024-02-18 PROCEDURE — 6370000000 HC RX 637 (ALT 250 FOR IP): Performed by: STUDENT IN AN ORGANIZED HEALTH CARE EDUCATION/TRAINING PROGRAM

## 2024-02-18 PROCEDURE — 2580000003 HC RX 258: Performed by: STUDENT IN AN ORGANIZED HEALTH CARE EDUCATION/TRAINING PROGRAM

## 2024-02-18 PROCEDURE — 94761 N-INVAS EAR/PLS OXIMETRY MLT: CPT

## 2024-02-18 PROCEDURE — 1200000000 HC SEMI PRIVATE

## 2024-02-18 PROCEDURE — 6370000000 HC RX 637 (ALT 250 FOR IP): Performed by: INTERNAL MEDICINE

## 2024-02-18 RX ORDER — BISACODYL 10 MG
10 SUPPOSITORY, RECTAL RECTAL DAILY PRN
Status: DISCONTINUED | OUTPATIENT
Start: 2024-02-18 | End: 2024-02-20 | Stop reason: HOSPADM

## 2024-02-18 RX ADMIN — CARVEDILOL 3.12 MG: 6.25 TABLET, FILM COATED ORAL at 20:29

## 2024-02-18 RX ADMIN — SODIUM CHLORIDE, PRESERVATIVE FREE 10 ML: 5 INJECTION INTRAVENOUS at 20:29

## 2024-02-18 RX ADMIN — CLOPIDOGREL BISULFATE 75 MG: 75 TABLET ORAL at 10:25

## 2024-02-18 RX ADMIN — CARVEDILOL 3.12 MG: 6.25 TABLET, FILM COATED ORAL at 10:25

## 2024-02-18 RX ADMIN — ASPIRIN 81 MG: 81 TABLET, CHEWABLE ORAL at 10:25

## 2024-02-18 RX ADMIN — HYDROMORPHONE HYDROCHLORIDE 0.5 MG: 1 INJECTION, SOLUTION INTRAMUSCULAR; INTRAVENOUS; SUBCUTANEOUS at 12:33

## 2024-02-18 RX ADMIN — IPRATROPIUM BROMIDE AND ALBUTEROL SULFATE 1 DOSE: 2.5; .5 SOLUTION RESPIRATORY (INHALATION) at 11:05

## 2024-02-18 RX ADMIN — LEVOTHYROXINE SODIUM 137 MCG: 25 TABLET ORAL at 06:38

## 2024-02-18 RX ADMIN — IPRATROPIUM BROMIDE AND ALBUTEROL SULFATE 1 DOSE: 2.5; .5 SOLUTION RESPIRATORY (INHALATION) at 07:32

## 2024-02-18 RX ADMIN — SODIUM CHLORIDE, PRESERVATIVE FREE 10 ML: 5 INJECTION INTRAVENOUS at 10:27

## 2024-02-18 RX ADMIN — POLYETHYLENE GLYCOL (3350) 17 G: 17 POWDER, FOR SOLUTION ORAL at 17:42

## 2024-02-18 RX ADMIN — LISINOPRIL 5 MG: 5 TABLET ORAL at 10:25

## 2024-02-18 RX ADMIN — SPIRONOLACTONE 25 MG: 50 TABLET ORAL at 10:25

## 2024-02-18 RX ADMIN — HYDROMORPHONE HYDROCHLORIDE 0.5 MG: 1 INJECTION, SOLUTION INTRAMUSCULAR; INTRAVENOUS; SUBCUTANEOUS at 02:52

## 2024-02-18 RX ADMIN — ONDANSETRON 4 MG: 4 TABLET, ORALLY DISINTEGRATING ORAL at 17:42

## 2024-02-18 RX ADMIN — HYDROCODONE BITARTRATE AND ACETAMINOPHEN 1 TABLET: 7.5; 325 TABLET ORAL at 10:25

## 2024-02-18 RX ADMIN — IPRATROPIUM BROMIDE AND ALBUTEROL SULFATE 1 DOSE: 2.5; .5 SOLUTION RESPIRATORY (INHALATION) at 21:44

## 2024-02-18 RX ADMIN — PANTOPRAZOLE SODIUM 40 MG: 40 TABLET, DELAYED RELEASE ORAL at 06:39

## 2024-02-18 RX ADMIN — ATORVASTATIN CALCIUM 80 MG: 40 TABLET, FILM COATED ORAL at 20:29

## 2024-02-18 RX ADMIN — GABAPENTIN 1600 MG: 400 CAPSULE ORAL at 14:34

## 2024-02-18 RX ADMIN — ENOXAPARIN SODIUM 40 MG: 100 INJECTION SUBCUTANEOUS at 10:25

## 2024-02-18 RX ADMIN — HYDROCODONE BITARTRATE AND ACETAMINOPHEN 1 TABLET: 7.5; 325 TABLET ORAL at 20:32

## 2024-02-18 RX ADMIN — IPRATROPIUM BROMIDE AND ALBUTEROL SULFATE 1 DOSE: 2.5; .5 SOLUTION RESPIRATORY (INHALATION) at 15:06

## 2024-02-18 ASSESSMENT — PAIN DESCRIPTION - LOCATION
LOCATION: HEAD;CHEST
LOCATION: CHEST

## 2024-02-18 ASSESSMENT — PAIN SCALES - WONG BAKER: WONGBAKER_NUMERICALRESPONSE: 2

## 2024-02-18 ASSESSMENT — PAIN SCALES - GENERAL
PAINLEVEL_OUTOF10: 7
PAINLEVEL_OUTOF10: 3
PAINLEVEL_OUTOF10: 7

## 2024-02-18 ASSESSMENT — PAIN DESCRIPTION - DESCRIPTORS
DESCRIPTORS: ACHING

## 2024-02-18 ASSESSMENT — PAIN DESCRIPTION - ORIENTATION
ORIENTATION: LEFT

## 2024-02-18 ASSESSMENT — PAIN DESCRIPTION - PAIN TYPE
TYPE: SURGICAL PAIN
TYPE: SURGICAL PAIN

## 2024-02-18 ASSESSMENT — PAIN DESCRIPTION - FREQUENCY: FREQUENCY: CONTINUOUS

## 2024-02-18 NOTE — PLAN OF CARE
Problem: Discharge Planning  Goal: Discharge to home or other facility with appropriate resources  2/17/2024 1621 by Berenice Schroeder, RN  Outcome: Progressing     Problem: Pain  Goal: Verbalizes/displays adequate comfort level or baseline comfort level  2/18/2024 0117 by Ruba Cardona, RN  Outcome: Progressing  2/17/2024 1621 by Berenice Schroeder, RN  Outcome: Progressing     Problem: Safety - Adult  Goal: Free from fall injury  2/18/2024 0117 by Ruba Cardona, RN  Outcome: Progressing  2/17/2024 1621 by Berenice Schroeder, RN  Outcome: Progressing

## 2024-02-18 NOTE — PLAN OF CARE
Problem: Discharge Planning  Goal: Discharge to home or other facility with appropriate resources  Outcome: Progressing     Problem: Pain  Goal: Verbalizes/displays adequate comfort level or baseline comfort level  2/18/2024 1017 by Althea Hernandez RN  Outcome: Progressing  2/18/2024 0117 by Ruba Cardona RN  Outcome: Progressing     Problem: Safety - Adult  Goal: Free from fall injury  2/18/2024 1017 by Althea Hernandez RN  Outcome: Progressing  2/18/2024 0117 by Ruba Cardona RN  Outcome: Progressing     Problem: Chronic Conditions and Co-morbidities  Goal: Patient's chronic conditions and co-morbidity symptoms are monitored and maintained or improved  2/18/2024 1017 by Althea Hernandez RN  Outcome: Progressing  2/18/2024 0117 by Ruba Cardona RN  Outcome: Progressing

## 2024-02-19 ENCOUNTER — APPOINTMENT (OUTPATIENT)
Dept: GENERAL RADIOLOGY | Age: 69
DRG: 200 | End: 2024-02-19
Attending: STUDENT IN AN ORGANIZED HEALTH CARE EDUCATION/TRAINING PROGRAM
Payer: COMMERCIAL

## 2024-02-19 PROCEDURE — 6360000002 HC RX W HCPCS: Performed by: STUDENT IN AN ORGANIZED HEALTH CARE EDUCATION/TRAINING PROGRAM

## 2024-02-19 PROCEDURE — 71045 X-RAY EXAM CHEST 1 VIEW: CPT

## 2024-02-19 PROCEDURE — 6370000000 HC RX 637 (ALT 250 FOR IP): Performed by: STUDENT IN AN ORGANIZED HEALTH CARE EDUCATION/TRAINING PROGRAM

## 2024-02-19 PROCEDURE — 2700000000 HC OXYGEN THERAPY PER DAY

## 2024-02-19 PROCEDURE — 6370000000 HC RX 637 (ALT 250 FOR IP): Performed by: INTERNAL MEDICINE

## 2024-02-19 PROCEDURE — 1200000000 HC SEMI PRIVATE

## 2024-02-19 PROCEDURE — 2580000003 HC RX 258: Performed by: STUDENT IN AN ORGANIZED HEALTH CARE EDUCATION/TRAINING PROGRAM

## 2024-02-19 PROCEDURE — 94761 N-INVAS EAR/PLS OXIMETRY MLT: CPT

## 2024-02-19 PROCEDURE — 94640 AIRWAY INHALATION TREATMENT: CPT

## 2024-02-19 RX ORDER — IPRATROPIUM BROMIDE AND ALBUTEROL SULFATE 2.5; .5 MG/3ML; MG/3ML
1 SOLUTION RESPIRATORY (INHALATION)
Status: DISCONTINUED | OUTPATIENT
Start: 2024-02-19 | End: 2024-02-20 | Stop reason: HOSPADM

## 2024-02-19 RX ADMIN — GABAPENTIN 1600 MG: 400 CAPSULE ORAL at 16:01

## 2024-02-19 RX ADMIN — ASPIRIN 81 MG: 81 TABLET, CHEWABLE ORAL at 09:18

## 2024-02-19 RX ADMIN — CARVEDILOL 3.12 MG: 6.25 TABLET, FILM COATED ORAL at 09:16

## 2024-02-19 RX ADMIN — HYDROCODONE BITARTRATE AND ACETAMINOPHEN 1 TABLET: 7.5; 325 TABLET ORAL at 05:58

## 2024-02-19 RX ADMIN — ACETAMINOPHEN, ASPIRIN, CAFFEINE 1 TABLET: 250; 65; 250 TABLET, FILM COATED ORAL at 15:56

## 2024-02-19 RX ADMIN — LISINOPRIL 5 MG: 5 TABLET ORAL at 09:18

## 2024-02-19 RX ADMIN — IPRATROPIUM BROMIDE AND ALBUTEROL SULFATE 1 DOSE: 2.5; .5 SOLUTION RESPIRATORY (INHALATION) at 07:43

## 2024-02-19 RX ADMIN — ATORVASTATIN CALCIUM 80 MG: 40 TABLET, FILM COATED ORAL at 20:39

## 2024-02-19 RX ADMIN — SODIUM CHLORIDE, PRESERVATIVE FREE 10 ML: 5 INJECTION INTRAVENOUS at 09:18

## 2024-02-19 RX ADMIN — PANTOPRAZOLE SODIUM 40 MG: 40 TABLET, DELAYED RELEASE ORAL at 05:54

## 2024-02-19 RX ADMIN — ACETAMINOPHEN 650 MG: 325 TABLET ORAL at 09:20

## 2024-02-19 RX ADMIN — SODIUM CHLORIDE, PRESERVATIVE FREE 10 ML: 5 INJECTION INTRAVENOUS at 20:40

## 2024-02-19 RX ADMIN — LEVOTHYROXINE SODIUM 137 MCG: 25 TABLET ORAL at 05:54

## 2024-02-19 RX ADMIN — ACETAMINOPHEN 650 MG: 325 TABLET ORAL at 01:49

## 2024-02-19 RX ADMIN — SPIRONOLACTONE 25 MG: 50 TABLET ORAL at 09:17

## 2024-02-19 RX ADMIN — ENOXAPARIN SODIUM 40 MG: 100 INJECTION SUBCUTANEOUS at 09:16

## 2024-02-19 RX ADMIN — CLOPIDOGREL BISULFATE 75 MG: 75 TABLET ORAL at 09:18

## 2024-02-19 ASSESSMENT — PAIN DESCRIPTION - DESCRIPTORS
DESCRIPTORS: ACHING

## 2024-02-19 ASSESSMENT — PAIN SCALES - GENERAL
PAINLEVEL_OUTOF10: 9
PAINLEVEL_OUTOF10: 3
PAINLEVEL_OUTOF10: 0
PAINLEVEL_OUTOF10: 9
PAINLEVEL_OUTOF10: 5

## 2024-02-19 ASSESSMENT — PAIN DESCRIPTION - ORIENTATION: ORIENTATION: ANTERIOR;RIGHT

## 2024-02-19 ASSESSMENT — PAIN SCALES - WONG BAKER: WONGBAKER_NUMERICALRESPONSE: 0

## 2024-02-19 ASSESSMENT — PAIN DESCRIPTION - ONSET: ONSET: ON-GOING

## 2024-02-19 ASSESSMENT — PAIN DESCRIPTION - LOCATION
LOCATION: HEAD
LOCATION: CHEST
LOCATION: HEAD

## 2024-02-19 ASSESSMENT — PAIN DESCRIPTION - FREQUENCY: FREQUENCY: CONTINUOUS

## 2024-02-19 NOTE — PROGRESS NOTES
Patient Name:  Brayan Galeana  Patient :  1955  Patient MRN:  [id number]     Primary Oncologist: Butch Acevedo MD  Referring Provider: Beverly Marie APRN - NP     Date of Service: 2024      Chief Complaint:    Chief Complaint   Patient presents with    Follow-up     Patient Active Problem List:     Suicide attempt (HCC)     Pacemaker     CHF (congestive heart failure) (HCC)     Lung nodule     Lesion of spleen     History of CVA in adulthood     History of laryngeal cancer     Personal history of MI (myocardial infarction)     Postoperative hypothyroidism     Mixed hyperlipidemia     History of laryngectomy     Primary hypertension    HPI:   Brayan Galeana is a 68 y.o. male with medical history significant for HTN, hyperlipidemia, hypothyroidism, CAD, CHF, s/p pacemaker, history of laryngeal cancer, s/p laryngectomy in  followed by adjuvant radiation therapy, referred to me on 24 for evaluation of lung nodule and splenic lesion.     He has been following with ENT surgeon at [facility] for his history of laryngeal cancer. He had CT soft tissue neck on 23 and it showed spiculated 1.9 cm partially visualized subpleural nodule in left upper lobe.     Subsequently had CT chest with contrast on 23 and it showed  1.5 x 1.7 cm peripheral left upper lobe nodule noted with a small peripheral lucency. This could represent a cavitating neoplastic nodule versus infection. Consider short-term follow-up CT, PET/CT and/or biopsy for further evaluation. 8mm hyperdense area of nodularity noted in the spleen. Both benign etiology such is a hemangioma and hypervascular metastasis are considerations. Consider a follow-up splenic protocol follow-up MRI.     His oncologist at [facility] requested CT guided biopsy but he cancelled it since he wants to have it done closer to home. He was referred to me for further evaluation.     He is a former smoker and he quits smoking since . He used to smoke 1.5 PPD for about 50

## 2024-02-19 NOTE — RT PROTOCOL NOTE
RT Inhaler-Nebulizer Bronchodilator Protocol Note    There is a bronchodilator order in the chart from a provider indicating to follow the RT Bronchodilator Protocol and there is an “Initiate RT Inhaler-Nebulizer Bronchodilator Protocol” order as well (see protocol at bottom of note).    CXR Findings:  XR CHEST PORTABLE    Result Date: 2/19/2024  Stable appearance of a left chest tube.  No pneumothorax is identified.     XR CHEST PORTABLE    Result Date: 2/18/2024  1. No sizable pneumothorax with a left basilar pigtail catheter in place. 2. Prominent interstitial lung markings may reflect a component of edema without significant change.       The findings from the last RT Protocol Assessment were as follows:   History Pulmonary Disease: None or smoker <15 pack years  Respiratory Pattern: Regular pattern and RR 12-20 bpm  Breath Sounds: Intermittent or unilateral wheezes  Cough: Strong, productive  Indication for Bronchodilator Therapy:    Bronchodilator Assessment Score: 5    Aerosolized bronchodilator medication orders have been revised according to the RT Inhaler-Nebulizer Bronchodilator Protocol below.    Respiratory Therapist to perform RT Therapy Protocol Assessment initially then follow the protocol.  Repeat RT Therapy Protocol Assessment PRN for score 0-3 or on second treatment, BID, and PRN for scores above 3.    No Indications - adjust the frequency to every 6 hours PRN wheezing or bronchospasm, if no treatments needed after 48 hours then discontinue using Per Protocol order mode.     If indication present, adjust the RT bronchodilator orders based on the Bronchodilator Assessment Score as indicated below.  Use Inhaler orders unless patient has one or more of the following: on home nebulizer, not able to hold breath for 10 seconds, is not alert and oriented, cannot activate and use MDI correctly, or respiratory rate 25 breaths per minute or more, then use the equivalent nebulizer order(s) with same

## 2024-02-20 ENCOUNTER — APPOINTMENT (OUTPATIENT)
Dept: GENERAL RADIOLOGY | Age: 69
DRG: 200 | End: 2024-02-20
Attending: STUDENT IN AN ORGANIZED HEALTH CARE EDUCATION/TRAINING PROGRAM
Payer: COMMERCIAL

## 2024-02-20 VITALS
SYSTOLIC BLOOD PRESSURE: 121 MMHG | HEIGHT: 69 IN | DIASTOLIC BLOOD PRESSURE: 72 MMHG | HEART RATE: 83 BPM | RESPIRATION RATE: 22 BRPM | OXYGEN SATURATION: 92 % | BODY MASS INDEX: 32.44 KG/M2 | WEIGHT: 219 LBS | TEMPERATURE: 98.5 F

## 2024-02-20 PROCEDURE — 94640 AIRWAY INHALATION TREATMENT: CPT

## 2024-02-20 PROCEDURE — 2580000003 HC RX 258: Performed by: STUDENT IN AN ORGANIZED HEALTH CARE EDUCATION/TRAINING PROGRAM

## 2024-02-20 PROCEDURE — 2700000000 HC OXYGEN THERAPY PER DAY

## 2024-02-20 PROCEDURE — 6370000000 HC RX 637 (ALT 250 FOR IP): Performed by: STUDENT IN AN ORGANIZED HEALTH CARE EDUCATION/TRAINING PROGRAM

## 2024-02-20 PROCEDURE — 94761 N-INVAS EAR/PLS OXIMETRY MLT: CPT

## 2024-02-20 PROCEDURE — 94618 PULMONARY STRESS TESTING: CPT

## 2024-02-20 PROCEDURE — 71045 X-RAY EXAM CHEST 1 VIEW: CPT

## 2024-02-20 PROCEDURE — 6360000002 HC RX W HCPCS: Performed by: STUDENT IN AN ORGANIZED HEALTH CARE EDUCATION/TRAINING PROGRAM

## 2024-02-20 RX ORDER — IPRATROPIUM BROMIDE AND ALBUTEROL SULFATE 2.5; .5 MG/3ML; MG/3ML
3 SOLUTION RESPIRATORY (INHALATION)
Qty: 360 ML | Refills: 1 | Status: SHIPPED | OUTPATIENT
Start: 2024-02-20

## 2024-02-20 RX ADMIN — SODIUM CHLORIDE, PRESERVATIVE FREE 10 ML: 5 INJECTION INTRAVENOUS at 10:06

## 2024-02-20 RX ADMIN — CARVEDILOL 3.12 MG: 6.25 TABLET, FILM COATED ORAL at 09:19

## 2024-02-20 RX ADMIN — LISINOPRIL 5 MG: 5 TABLET ORAL at 09:22

## 2024-02-20 RX ADMIN — ENOXAPARIN SODIUM 40 MG: 100 INJECTION SUBCUTANEOUS at 09:18

## 2024-02-20 RX ADMIN — SPIRONOLACTONE 25 MG: 50 TABLET ORAL at 09:20

## 2024-02-20 RX ADMIN — GABAPENTIN 1600 MG: 400 CAPSULE ORAL at 14:08

## 2024-02-20 RX ADMIN — ASPIRIN 81 MG: 81 TABLET, CHEWABLE ORAL at 09:22

## 2024-02-20 RX ADMIN — LEVOTHYROXINE SODIUM 137 MCG: 25 TABLET ORAL at 06:37

## 2024-02-20 RX ADMIN — CLOPIDOGREL BISULFATE 75 MG: 75 TABLET ORAL at 09:20

## 2024-02-20 RX ADMIN — ACETAMINOPHEN, ASPIRIN, CAFFEINE 1 TABLET: 250; 65; 250 TABLET, FILM COATED ORAL at 03:20

## 2024-02-20 RX ADMIN — IPRATROPIUM BROMIDE AND ALBUTEROL SULFATE 1 DOSE: 2.5; .5 SOLUTION RESPIRATORY (INHALATION) at 09:00

## 2024-02-20 RX ADMIN — PANTOPRAZOLE SODIUM 40 MG: 40 TABLET, DELAYED RELEASE ORAL at 06:37

## 2024-02-20 ASSESSMENT — PAIN DESCRIPTION - LOCATION: LOCATION: HEAD

## 2024-02-20 ASSESSMENT — PAIN SCALES - GENERAL
PAINLEVEL_OUTOF10: 0
PAINLEVEL_OUTOF10: 5
PAINLEVEL_OUTOF10: 0

## 2024-02-20 ASSESSMENT — PAIN DESCRIPTION - DESCRIPTORS: DESCRIPTORS: ACHING;PRESSURE

## 2024-02-20 NOTE — PLAN OF CARE
Problem: Discharge Planning  Goal: Discharge to home or other facility with appropriate resources  2/20/2024 1525 by Lucho Florian RN  Outcome: Adequate for Discharge  2/20/2024 0838 by Perla Fernandes  Outcome: Progressing     Problem: Pain  Goal: Verbalizes/displays adequate comfort level or baseline comfort level  2/20/2024 1525 by Lucho Florian RN  Outcome: Adequate for Discharge  Flowsheets (Taken 2/20/2024 1040 by Perla Fernandes)  Verbalizes/displays adequate comfort level or baseline comfort level:   Encourage patient to monitor pain and request assistance   Assess pain using appropriate pain scale  2/20/2024 0838 by Perla Fernandes  Outcome: Progressing     Problem: Safety - Adult  Goal: Free from fall injury  2/20/2024 1525 by Lucho Florian RN  Outcome: Adequate for Discharge  2/20/2024 0838 by Perla Fernandes  Outcome: Progressing     Problem: Chronic Conditions and Co-morbidities  Goal: Patient's chronic conditions and co-morbidity symptoms are monitored and maintained or improved  2/20/2024 1525 by Lucho Florian RN  Outcome: Adequate for Discharge  2/20/2024 0838 by Perla Fernandes  Outcome: Progressing

## 2024-02-20 NOTE — PROGRESS NOTES
2/20/2024 1:55 PM  Patient Room #: 3010/3010-A  Patient Name: Brayan Galeana    (Step 1 Done by RN if possible otherwise call Pulmonary Diagnostics)  Place patient on room air at rest for at least 30 minutes.  If patient falls below 88% before 30 minutes then you can record the level and stop.  Record room air saturation level _88_ %.  If patient is at 88% or below, they will qualify for home oxygen and you can stop.  If level does not fall below 88%, fill in level above. If indicated continue to Step 2.   Signature:__Ken Schmidt RRT___ Date: _02/20/2024__  (Step 2&3 Done by P)  Ambulate patient on room air until saturation falls below 89%.  Record level of room air saturation with ambulation___ %.  Next, place patient back on ___lpm oxygen and ambulate, record level __%.  (Note:  this level must show improvement from room air level done with ambulation.)  If patient’s saturation on room air with ambulation is 88% or below AND patient shows improvement with oxygen during ambulation, they will qualify for home oxygen and you can stop.  If patient does not drop below 89%, then patient should have an overnight oximetry trending on room air to see if level falls below 88%.  Complete level in Step 3 below.    Room air overnight oximetry level 88 % for___  cumulative minutes.  If patient’s room air oxygen level is below <89% for any amount of time they will qualify for nocturnal home oxygen.        (Attach Night Trending Report)    Complete order below: Diagnosis:_CHF___  Home oxygen at:  Length of Need: X? Lifetime ? 3 Months     _3__lpm or 31__%   via  [] nasal cannula  []mask  [x] other   (trach mask)     [x]continuous [x]  with activity  [x]  Nocturnal   [x] Portable Tanks [x]  Concentrator  [] Conserving Device        Therapist Signature:__Ken Schmidt RRT_____     Date:  _02/20/2024__  Physician Signature:  __Electronically Signed in EMR_    Date:___  Physician Printed Name:  ___Aleksandra 
    V2.0    [facility] [place]      Name:  Brayan Galeana /Age/Sex: 1955  (68 y.o. male)   MRN & CSN:  [id number] & [id number] Encounter Date/Time: 2024 12:37 PM EST   Location:  [facility] PCP: Beverly Marie APRN - NP     Attending:Marty Montano MD       Hospital Day: 4    Assessment and Recommendations   Brayan Galeana is a 68 y.o. male who presents with Pneumothorax      1.  Acute left pneumothorax  -Status post lung biopsy for lung mass.  Large pneumothorax was noticed postprocedure after successful CT-guided left upper lobe lung mass biopsy and a chest tube was placed  -Pulmonology following for chest tube management  -Repeat chest x-ray shows stable left thoracostomy tube with presence of trace left apical pneumothorax  -Continue supplemental oxygen    Rest of chronic medical conditions.  Continue medical management    Hyperlipidemia-atorvastatin  GERD-Omeprazole  Hypothyroidism-levothyroxine  COPD-not in acute exacerbation, continue home meds  HTN-lisinopril and Coreg  History of malignant neoplasm of the larynx-status post laryngectomy   History of CAD continue ASA, Plavix and statins  History of CHF-continue ACE and spironolactone    Diet ADULT DIET; Regular; Low Fat/Low Chol/High Fiber/2 gm Na; Low Sodium (2 gm)   DVT Prophylaxis [] Lovenox, []  Heparin, [] SCDs, [] Ambulation,  [] Eliquis, [] Xarelto  [] Coumadin   Code Status Full Code   Disposition From: Home  Expected Disposition: Home  Estimated Date of Discharge: 1 to 2 days  Patient requires continued admission due to left apical pneumothorax   Surrogate Decision Maker/ POA       Personally reviewed Lab Studies and Imaging             Subjective:     Chief Complaint:     Patient seen and examined at bedside this morning.  Denies chest pain, shortness of breath, nausea vomiting, fever or chills    Review of Systems:      Pertinent positives and negatives discussed in HPI    Objective:     Intake/Output Summary (Last 24 hours) 
    V2.0    [facility] [place]      Name:  Brayan Galeana /Age/Sex: 1955  (68 y.o. male)   MRN & CSN:  [id number] & [id number] Encounter Date/Time: 2024 12:37 PM EST   Location:  [facility]-A PCP: Beverly Marie APRN - NP     Attending:Marty Montano MD       Hospital Day: 5    Assessment and Recommendations   Brayan Galeana is a 68 y.o. male who presents with Pneumothorax      1.  Acute left pneumothorax  -Status post lung biopsy for lung mass.  Large pneumothorax was noticed postprocedure after successful CT-guided left upper lobe lung mass biopsy and a chest tube was placed  -Pulmonology following for chest tube management  -Repeat chest x-ray shows stable left thoracostomy tube with presence of trace left apical pneumothorax  -Continue supplemental oxygen      Rest of chronic medical conditions.  Continue medical management    Hyperlipidemia-atorvastatin  GERD-Omeprazole  Hypothyroidism-levothyroxine  COPD-not in acute exacerbation, continue home meds  HTN-lisinopril and Coreg  History of malignant neoplasm of the larynx-status post laryngectomy   History of CAD continue ASA, Plavix and statins  History of CHF-continue ACE and spironolactone    Diet ADULT DIET; Regular; Low Fat/Low Chol/High Fiber/2 gm Na; Low Sodium (2 gm)   DVT Prophylaxis [] Lovenox, []  Heparin, [] SCDs, [] Ambulation,  [] Eliquis, [] Xarelto  [] Coumadin   Code Status Full Code   Disposition From: Home  Expected Disposition: Home  Estimated Date of Discharge: 1 to 2 days  Patient requires continued admission due to left apical pneumothorax   Surrogate Decision Maker/ POA       Personally reviewed Lab Studies and Imaging             Subjective:     Chief Complaint:     Patient seen and examined at bedside this morning.  Reports headache.  Denies chest pain, fever or chills    Review of Systems:      Pertinent positives and negatives discussed in HPI    Objective:     Intake/Output Summary (Last 24 hours) at 2024 
   02/19/24 1101   Encounter Summary   Encounter Overview/Reason  Initial Encounter   Service Provided For: Patient   Referral/Consult From: [facility]   Support System Family members   Last Encounter  02/19/24  (PT doing well today, he's dealing with a 2nd round of cancer, lung cancer, he was in good spirits, he finds meaning in his nick in God, he is trusting in God for health and eternity, he wanted prayer.)   Complexity of Encounter Low   Begin Time 1046   End Time  1103   Total Time Calculated 17 min   Spiritual/Emotional needs   Type Spiritual Support;Emotional Distress   Grief, Loss, and Adjustments   Type Life Adjustments;Adjustment to illness   Assessment/Intervention/Outcome   Assessment Calm;Concerns with suffering;Coping;Hopeful;Stress overload   Intervention Active listening;Discussed belief system/Orthodoxy practices/nick;Discussed illness injury and it’s impact;Discussed meaning/purpose;Discussed relationship with God;Explored/Affirmed feelings, thoughts, concerns;Explored Coping Skills/Resources;Nurtured Hope;Prayer (assurance of)/[place];Sustaining Presence/Ministry of presence   Outcome Acceptance;Comfort;Coping;Encouraged;Engaged in conversation;Expressed feelings, needs, and concerns;Expressed Gratitude;Peace   Plan and Referrals   Plan/Referrals Continue Support (comment)       
  Patient was seen in hospital for  CHF  .  I am prescribing oxygen because the diagnosis and testing requires the patient to have oxygen in the home.  Conditions will improve or be benefited by oxygen use.  The patient is able to perform good mobility and therefore requires the use of a portable oxygen system for ambulation.    
4 Eyes Skin Assessment     NAME:  Brayan Galeana  YOB: 1955  MEDICAL RECORD NUMBER:  [id number]    The patient is being assessed for  Admission    I agree that at least one RN has performed a thorough Head to Toe Skin Assessment on the patient. ALL assessment sites listed below have been assessed.      Areas assessed by both nurses:    Head, Face, Ears, Shoulders, Back, Chest, Arms, Elbows, Hands, Sacrum. Buttock, Coccyx, Ischium, and Legs. Feet and Heels        Does the Patient have a Wound? No noted wound(s)       Benjamin Prevention initiated by RN: No  Wound Care Orders initiated by RN: No    Pressure Injury (Stage 3,4, Unstageable, DTI, NWPT, and Complex wounds) if present, place Wound referral order by RN under : No    New Ostomies, if present place, Ostomy referral order under : No     Nurse 1 eSignature: Electronically signed by Reyna Sarah RN on 2/16/24 at 1:49 AM EST    **SHARE this note so that the co-signing nurse can place an eSignature**    Nurse 2 eSignature: Electronically signed by EUGENE LACEY RN on 2/16/24 at 2:06 AM EST  
Chest tube removed at bedside. Dry sterile dressing applied. Report called to Maria C PARR on 3 east.   
Discharge education completed with pt, pt demonstrated appropriate teach back, IV's removed, pt has prescription called in hand medication's and side effect education completed, pt has discharge paperwork, pt has home O2, pt denies any needs or questions at this time.    
Nurse call RRT to ;et me know that this patient's SaO2 had dropped to 85%    On 28% and 3lpm Venti mask.    The Nurse had stated that he had increased the pt to 6lpm and 50%.    Pt is tolerating well.    
Outpatient Pharmacy Progress Note for Meds-to-Beds    Total number of Prescriptions Filled: 1  The following medications were dispensed to the patient during the discharge process:  pratropium 0.5 mg-albuterol 2.5 mg     Additional Documentation:  Medication(s) were delivered to the patient's room prior to discharge by a volunteer       Thank you for letting us serve your patients.  [place] [id number]    Phone: [phone]    Fax: [phone]        
Pt home O2 paperwork faxed to [facility]. Please do not discharge pt without oxygen. This testing will be good for 48 hours and will have to be repeated if pt has not discharged prior to then. If portable oxygen concentrator or tank has not been delivered prior to pt being ready to leave, please call PFT @ [phone] or Respiratory Therapy @ [phone]. Thanks.  
Pt oxygen concentrator delivered to patient room. Pt and/or family educated on use of concentrator and expressed understanding with equipment and how to operate. Pt instructed to immediately call [facility] @ [phone] upon arrival @ [place].   
Pt refusing chair alarm, he is requesting a waiver   
Pt remains 90-94 on 3 L trach mask.   
Pt turned down to 3L per trach mask per Dr Cochran's Verbal Orders, pt is on . Will re-assess in approximately 15 minute's if O2 saturation is WNL, will take off supplemental O2 and re-evaluate  
Pt was placed on 4 liters NC and was still in the low 80's. Pt placed back on trach mask and Doctor paged, O2 now 92%  
Pt went to 87% spo2, we are trailing pt on nasal cannula as he does not have a cici tube for his Trach Prosthesis.   
Pulmonary and Critical Care  Progress Note    Subjective:   The patient has improved.CXR:Better.  Shortness of breath has improved  Chest pain at chest tube site.  Addressing respiratory complaints Patient is negative for  hemoptysis and cyanosis  CONSTITUTIONAL:  negative for fevers and chills      Past Medical History:     has a past medical history of Cerebrovascular disease, CHF (congestive heart failure) (HCC), COPD (chronic obstructive pulmonary disease) (HCC), Heart attack (HCC), Hyperlipidemia, Hypertension, Hypothyroidism, Neuropathy, Pacemaker, Stroke (HCC), and Throat cancer (HCC).   has a past surgical history that includes Appendectomy (1961); Ankle surgery; tracheostomy; pacemaker placement; Thumb arthroscopy; and CT NEEDLE BIOPSY LUNG PERCUTANEOUS (2/15/2024).   reports that he has quit smoking. His smoking use included cigarettes. He started smoking about 52 years ago. He has a 84.0 pack-year smoking history. He has been exposed to tobacco smoke. He has never used smokeless tobacco. He reports that he does not currently use alcohol. He reports that he does not currently use drugs.  Family history:  family history includes Alcohol Abuse in his father; Cancer in his brother, brother, brother, and father; Diabetes in his brother, mother, and sister; Heart Attack in his mother; Heart Disease in his father and mother; Lung Cancer in his father.    No Known Allergies  Social History:    Reviewed; no changes    Objective:   PHYSICAL EXAM:        VITALS:  /70   Pulse 75   Temp 98.4 °F (36.9 °C) (Oral)   Resp 16   Ht 1.753 m (5' 9.02\")   Wt 99.3 kg (219 lb)   SpO2 92%   BMI 32.33 kg/m²     24HR INTAKE/OUTPUT:    Intake/Output Summary (Last 24 hours) at 2/17/2024 1233  Last data filed at 2/17/2024 0606  Gross per 24 hour   Intake 240 ml   Output 245 ml   Net -5 ml       CONSTITUTIONAL:  awake, alert, cooperative, no apparent distress, and appears stated age  LUNGS:  decreased breath 
Pulmonary and Critical Care  Progress Note    Subjective:   The patient has improved.CXR:Stable.No air leak.  Shortness of breath has improved  Chest pain at chest tube site.  Addressing respiratory complaints Patient is negative for  hemoptysis and cyanosis  CONSTITUTIONAL:  negative for fevers and chills      Past Medical History:     has a past medical history of Cerebrovascular disease, CHF (congestive heart failure) (HCC), COPD (chronic obstructive pulmonary disease) (HCC), Heart attack (HCC), Hyperlipidemia, Hypertension, Hypothyroidism, Neuropathy, Pacemaker, Stroke (HCC), and Throat cancer (HCC).   has a past surgical history that includes Appendectomy (1961); Ankle surgery; tracheostomy; pacemaker placement; Thumb arthroscopy; and CT NEEDLE BIOPSY LUNG PERCUTANEOUS (2/15/2024).   reports that he has quit smoking. His smoking use included cigarettes. He started smoking about 52 years ago. He has a 84.0 pack-year smoking history. He has been exposed to tobacco smoke. He has never used smokeless tobacco. He reports that he does not currently use alcohol. He reports that he does not currently use drugs.  Family history:  family history includes Alcohol Abuse in his father; Cancer in his brother, brother, brother, and father; Diabetes in his brother, mother, and sister; Heart Attack in his mother; Heart Disease in his father and mother; Lung Cancer in his father.    No Known Allergies  Social History:    Reviewed; no changes    Objective:   PHYSICAL EXAM:        VITALS:  /78   Pulse 88   Temp 98.7 °F (37.1 °C)   Resp 19   Ht 1.753 m (5' 9.02\")   Wt 99.3 kg (219 lb)   SpO2 99%   BMI 32.33 kg/m²     24HR INTAKE/OUTPUT:    Intake/Output Summary (Last 24 hours) at 2/19/2024 1020  Last data filed at 2/19/2024 0421  Gross per 24 hour   Intake --   Output 200 ml   Net -200 ml       CONSTITUTIONAL:  awake, alert, cooperative, no apparent distress, and appears stated age  LUNGS:  decreased breath 
Pulmonary and Critical Care  Progress Note    Subjective:   The patient is better.  Shortness of breath has improved  Chest pain at chest tube site.  Addressing respiratory complaints Patient is negative for  hemoptysis and cyanosis  CONSTITUTIONAL:  negative for fevers and chills      Past Medical History:     has a past medical history of Cerebrovascular disease, CHF (congestive heart failure) (HCC), COPD (chronic obstructive pulmonary disease) (HCC), Heart attack (HCC), Hyperlipidemia, Hypertension, Hypothyroidism, Neuropathy, Pacemaker, Stroke (HCC), and Throat cancer (HCC).   has a past surgical history that includes Appendectomy (1961); Ankle surgery; tracheostomy; pacemaker placement; Thumb arthroscopy; and CT NEEDLE BIOPSY LUNG PERCUTANEOUS (2/15/2024).   reports that he has quit smoking. His smoking use included cigarettes. He started smoking about 52 years ago. He has a 84.0 pack-year smoking history. He has been exposed to tobacco smoke. He has never used smokeless tobacco. He reports that he does not currently use alcohol. He reports that he does not currently use drugs.  Family history:  family history includes Alcohol Abuse in his father; Cancer in his brother, brother, brother, and father; Diabetes in his brother, mother, and sister; Heart Attack in his mother; Heart Disease in his father and mother; Lung Cancer in his father.    No Known Allergies  Social History:    Reviewed; no changes    Objective:   PHYSICAL EXAM:        VITALS:  /82   Pulse 75   Temp 98.3 °F (36.8 °C) (Oral)   Resp 13   Ht 1.753 m (5' 9.02\")   Wt 99.3 kg (219 lb)   SpO2 93%   BMI 32.33 kg/m²     24HR INTAKE/OUTPUT:    Intake/Output Summary (Last 24 hours) at 2/18/2024 1446  Last data filed at 2/18/2024 0928  Gross per 24 hour   Intake 10 ml   Output 10 ml   Net 0 ml       CONSTITUTIONAL:  awake, alert, cooperative, no apparent distress, and appears stated age  LUNGS:  decreased breath sounds  CARDIOVASCULAR:  normal 
Pulmonary and Critical Care  Progress Note    Subjective:   The patient is better.Chest tube out.TTNA:Sq cell Ca.  Shortness of breath has improved  Chest pain none.  Addressing respiratory complaints Patient is negative for  hemoptysis and cyanosis  CONSTITUTIONAL:  negative for fevers and chills      Past Medical History:     has a past medical history of Cerebrovascular disease, CHF (congestive heart failure) (HCC), COPD (chronic obstructive pulmonary disease) (HCC), Heart attack (HCC), Hyperlipidemia, Hypertension, Hypothyroidism, Neuropathy, Pacemaker, Stroke (HCC), and Throat cancer (HCC).   has a past surgical history that includes Appendectomy (1961); Ankle surgery; tracheostomy; pacemaker placement; Thumb arthroscopy; and CT NEEDLE BIOPSY LUNG PERCUTANEOUS (2/15/2024).   reports that he has quit smoking. His smoking use included cigarettes. He started smoking about 52 years ago. He has a 84.0 pack-year smoking history. He has been exposed to tobacco smoke. He has never used smokeless tobacco. He reports that he does not currently use alcohol. He reports that he does not currently use drugs.  Family history:  family history includes Alcohol Abuse in his father; Cancer in his brother, brother, brother, and father; Diabetes in his brother, mother, and sister; Heart Attack in his mother; Heart Disease in his father and mother; Lung Cancer in his father.    No Known Allergies  Social History:    Reviewed; no changes    Objective:   PHYSICAL EXAM:        VITALS:  /75   Pulse 90   Temp 98.2 °F (36.8 °C) (Oral)   Resp 24   Ht 1.753 m (5' 9.02\")   Wt 99.3 kg (219 lb)   SpO2 92%   BMI 32.33 kg/m²     24HR INTAKE/OUTPUT:  No intake or output data in the 24 hours ending 02/20/24 1031      CONSTITUTIONAL:  awake, alert, cooperative, no apparent distress, and appears stated age  LUNGS:  decreased breath sounds  CARDIOVASCULAR:  normal S1 and S2 and negative JVD  ABD:Abdomen soft, non-tender. BS normal. No 
RR-RN asked by primary RN to assess patient's chest tube as patient is c/o discomfort and SOB. Upon arrival patient wheezing but in no apparent distress. Oxygen saturation 94% on 4L per trach mask. Chest tube assessed. Transparent CHG dressing clean dry and intact, all connections throughout pleurx catheter and atrium unit are sealed and secure. Suction maintained at   -[id number]. Patient reassured of assessment and repositioned in the bed. He states that he will wait for next pain medication administration. Primary RN to monitor and notify if any changes in chest tube or patient condition arise. Please don't hesitate to call or utilize [employer] messaging for Rapid Resource RN should any further needs or concerns arise.      Roby Jurado   Rapid Resouce RN   [facility] [phone]    
RRT checked on pt.  Pt is sleeping    RRT found the CATC on 60% and the flow was on 4lpm    RRT changed the O2 to 28% and the flow to 5lpm    Pt's SaO2 is 97% on the 60lpm and 4lpm    Pt is tolerating well.    
Spo2 84% on 2L NC, O2 increased to 3L NC,SpO2 up to 91%   Lexii Collins RN    
oriented to person, place and time , normal sensation , short and long term memory intact
CHEST 2/16/2024 3:57 am COMPARISON: 02/15/2024 HISTORY: ORDERING SYSTEM PROVIDED HISTORY: f/u on pneumothorax TECHNOLOGIST PROVIDED HISTORY: Reason for exam:->f/u on pneumothorax Reason for Exam: f/u on pneumothorax FINDINGS: Left subclavian ICD in place.The cardiac and mediastinal contours appear unchanged.  Pigtail left chest tube remains in place.  Interstitial opacities throughout the left lung and basilar opacities again noted.  No new airspace disease identified in the interval.  Trace left apical pneumothorax remains visible.     No significant interval change.  Persistent trace left apical pneumothorax with chest tube in place.     XR CHEST PORTABLE    Result Date: 2/15/2024  EXAMINATION: ONE XRAY VIEW OF THE CHEST 2/15/2024 1:02 pm COMPARISON: 2/15/2024 HISTORY: ORDERING SYSTEM PROVIDED HISTORY: pneumothorax TECHNOLOGIST PROVIDED HISTORY: Reason for exam:->pneumothorax Reason for Exam: pneumothorax Additional signs and symptoms: NA Relevant Medical/Surgical History: NA FINDINGS: Left-sided cardiac pacing device is identified.  Left thoracostomy tube is stable. The lungs and pleural spaces are without acute focal process.  The cardiomediastinal silhouette is without acute process. Trace left apical pneumothorax is noted. The osseous structures are without acute process.     Stable left thoracostomy tube with presence of a trace left apical pneumothorax.     CT NEEDLE BIOPSY LUNG PERCUTANEOUS    Result Date: 2/15/2024  EXAMINATION: CT guided left upper lobe lobe lung mass biopsy.  CT-guided left-sided chest tube placement.  2/15/2024 8:51 am TECHNIQUE: After obtaining informed consent, the patient was placed in the supine position on the CT table. The skin superficial to the mass was identified and cleansed using a sterile technique. Next, an 20 gauge biopsy needle via a 19 gauge needle guide was percutaneously placed into the soft tissue mass. Needle positioning was verified with CT guidance.  4 core samples 
and/or weight based adjustment of the mA/kV was utilized to reduce the radiation dose to as low as reasonably achievable.  DLP: 1137.18 COMPARISON: PET-CT 01/26/2024 HISTORY: Patient with a solid left upper lobe lung mass. A request has been made for biopsy. FINDINGS: Adequate needle tip positioning adjacent to the lateral left upper lobe pleural based mass.  Adequate left-sided anterior chest tube positioning for evacuation of the pneumothorax.     Successful CT guided left upper lobe lung mass biopsy as described above Large pnemothorax was noted on post procedure scans requiring a chest tube. A 10 Turkish locking pigtail catheter was placed., Follow up chest x-ray will be performed.  The patient will be admitted for chest tube management. SEDATION: The patient received 1.0 mg of Versed and 50 mcg of fentanyl intravenously for sedation and pain control.  Sedation was provided by Silvia Parish RN. Sedation time: 61 minutes.  Moderate sedation was monitored under the physician's direction.  The patient's vital signs were monitored throughout the procedure and recorded in the patient's medical record by the nurse.     XR CHEST PORTABLE    Result Date: 2/15/2024  EXAMINATION: ONE XRAY VIEW OF THE CHEST 2/15/2024 10:44 am COMPARISON: 01/31/2024. HISTORY: ORDERING SYSTEM PROVIDED HISTORY: post left lung biopsy/left chest tube insertion TECHNOLOGIST PROVIDED HISTORY: [facility] room 4 Reason for exam:->post left lung biopsy/left chest tube insertion Reason for Exam: post left lung biopsy/left chest tube insertion FINDINGS: The cardiac silhouette and mediastinal contours are stable.  There is a left-sided cardiac device.  There is pulmonary vascular congestion with low lung volumes and bibasilar atelectasis.  Left chest tube is noted.  No pneumothorax. The mass noted in the left upper lobe along the lateral chest wall may be obscured by the pacer.     1. Left chest tube in place with no pneumothorax. 2. Pulmonary vascular

## 2024-02-20 NOTE — CARE COORDINATION
Chart reviewed and met w/ pt for continued discharge planning. CM introduced self and explained role. Pt is from home w/ his fiance. Pt has PCP and insurance. Pt was independent with all ADL's and trach care PTA. Pt declined any needs from CM at this time. CM available should needs present.        02/20/24 4604   Service Assessment   Patient Orientation Alert and Oriented   Cognition Alert   History Provided By Patient   Primary Caregiver Self   Support Systems Spouse/Significant Other   Patient's Healthcare Decision Maker is: Legal Next of Kin   Prior Functional Level Independent in ADLs/IADLs   Current Functional Level Independent in ADLs/IADLs   Can patient return to prior living arrangement Yes   Ability to make needs known: Good   Family able to assist with home care needs: Yes   Financial Resources Medicare   Social/Functional History   Lives With Significant other   Type of Home House   Home Layout One level   Home Access Ramped entrance   ADL Assistance Independent   Discharge Planning   Type of Residence House   Living Arrangements Spouse/Significant Other   Potential Assistance Purchasing Medications No   Type of Home Care Services None

## 2024-02-21 ENCOUNTER — TELEPHONE (OUTPATIENT)
Dept: FAMILY MEDICINE CLINIC | Age: 69
End: 2024-02-21

## 2024-02-21 NOTE — TELEPHONE ENCOUNTER
.Care Transitions Initial Follow Up Call    Outreach made within 2 business days of discharge: Yes    Patient: Brayan Galeana Patient : 1955   MRN: [id number]  Reason for Admission: There are no discharge diagnoses documented for the most recent discharge.  Discharge Date: 24       Spoke with: patient    Discharge department/facility: [facility]    TCM Interactive Patient Contact:  Was patient able to fill all prescriptions: Yes  Was patient instructed to bring all medications to the follow-up visit: Yes  Is patient taking all medications as directed in the discharge summary? No  Does patient understand their discharge instructions: Yes  Does patient have questions or concerns that need addressed prior to 7-14 day follow up office visit: no    Scheduled appointment with PCP within 7-14 days    Follow Up  Future Appointments   Date Time Provider Department Center   2024 11:45 AM Butch Acevedo MD [facility]   2024 12:00 PM [facility], MED ONC NURSE [facility] [place]   2024 10:30 AM Marie, Beverly J, APRN - NP [facility]   4/3/2024 10:30 AM Marie, Beverly J, APRN - NP [facility]       Summer Puentes MA

## 2024-02-22 ENCOUNTER — HOSPITAL ENCOUNTER (OUTPATIENT)
Dept: RADIATION ONCOLOGY | Age: 69
Discharge: HOME | End: 2024-02-22
Payer: COMMERCIAL

## 2024-02-22 ENCOUNTER — HOSPITAL ENCOUNTER (OUTPATIENT)
Dept: INFUSION THERAPY | Age: 69
Discharge: HOME | End: 2024-02-22

## 2024-02-22 ENCOUNTER — OFFICE VISIT (OUTPATIENT)
Dept: ONCOLOGY | Age: 69
End: 2024-02-22
Payer: COMMERCIAL

## 2024-02-22 VITALS
BODY MASS INDEX: 32.29 KG/M2 | WEIGHT: 218 LBS | HEIGHT: 69 IN | DIASTOLIC BLOOD PRESSURE: 64 MMHG | RESPIRATION RATE: 18 BRPM | OXYGEN SATURATION: 99 % | SYSTOLIC BLOOD PRESSURE: 123 MMHG | TEMPERATURE: 97.6 F | HEART RATE: 77 BPM

## 2024-02-22 DIAGNOSIS — Z85.21 HISTORY OF LARYNGEAL CANCER: Primary | ICD-10-CM

## 2024-02-22 PROCEDURE — 3074F SYST BP LT 130 MM HG: CPT | Performed by: INTERNAL MEDICINE

## 2024-02-22 PROCEDURE — 99214 OFFICE O/P EST MOD 30 MIN: CPT | Performed by: INTERNAL MEDICINE

## 2024-02-22 PROCEDURE — 3078F DIAST BP <80 MM HG: CPT | Performed by: INTERNAL MEDICINE

## 2024-02-22 PROCEDURE — 99205 OFFICE O/P NEW HI 60 MIN: CPT | Performed by: RADIOLOGY

## 2024-02-22 PROCEDURE — 99213 OFFICE O/P EST LOW 20 MIN: CPT

## 2024-02-22 PROCEDURE — 1123F ACP DISCUSS/DSCN MKR DOCD: CPT | Performed by: INTERNAL MEDICINE

## 2024-02-22 NOTE — PROGRESS NOTES
Pt with Cardiac Device:[employer] Evera MRI XT DR   Implant Date: 9/23/2017 by Dr. Dubon [phone]  Serial Number: [id number]  Model Number: [id number]  ID: [id number]    ID Card scanned to documents in [employer].    QCL sent to [facility] per protocol.

## 2024-02-22 NOTE — CONSULTS
process.  There is no effusion or pneumothorax. The cardiomediastinal silhouette is without acute process. The osseous structures are without acute process.     No acute process.     PET CT SKULL BASE TO MID THIGH    Result Date: 1/27/2024  EXAMINATION: Skull base to midthigh PET/CT 1/26/2024 TECHNIQUE: Following IV injection of 12.71 mCi of F-18 FDG, PET  tumor imaging was acquired from the base of the skull to the mid thighs.  Computed tomography was used for purposes of attenuation correction and anatomic localization. Fusion imaging was utilized for interpretation. Uptake time 61 min.  Glucose level 95 mg/dl. COMPARISON: CT chest dated 12/09/2023 HISTORY: ORDERING SYSTEM PROVIDED HISTORY: Lung nodule TECHNOLOGIST PROVIDED HISTORY: Reason for exam:->lung nodule, to r/o lung cancer Reason for Exam: Lung nodule, spleen lesion, increased SOB, history of laryngeal cancer 2013, 12.71 mCi F18 FDG, 61 minute uptake,bgl=95 FINDINGS: HEAD/NECK: Postoperative change of tracheostomy.  Relatively diffuse activity within cervical soft tissues, likely physiologic.  Leftward shift of the mid cervical trachea. CHEST: Pacemaker in the left chest wall.  Calcification of the thoracic aorta.  Coronary artery calcification.  Calcified mediastinal lymph nodes, in keeping with granulomatous disease.  Mild relatively symmetric hilar activity, typically reactive or granulomatous.  No hypermetabolic axillary adenopathy.  Muscular activity about the shoulders bilaterally, likely inflammatory. 1.7 cm lateral left upper lobe pulmonary nodule with spiculated margins has SUV maximum 11.7.  Respiratory motion artifact is seen.  No focal airspace disease.  No pneumothorax or pleural effusion. ABDOMEN/PELVIS: Excretion of activity is seen from bilateral kidneys and activity is seen within ureters and urinary bladder.  Bowel activity is seen which can be physiologically variable. Cholelithiasis.  Calcified granulomas within the spleen.

## 2024-02-22 NOTE — PRE SEDATION
Sedation Pre-Procedure Note    Patient Name: Brayan Galeana   YOB: 1955  Room/Bed: 3010/3010-A  Medical Record Number: [id number]  Date: 2/15/2024 Time: 10:48 AM       Indication:  CT lung biopsy    Consent: I have discussed with the patient and/or the patient representative the indication, alternatives, and the possible risks and/or complications of the planned procedure and the anesthesia methods. The patient and/or patient representative appear to understand and agree to proceed.    Vital Signs:   Vitals:    02/20/24 1510   BP: 121/72   Pulse: 83   Resp: 22   Temp: 98.5 °F (36.9 °C)   SpO2: 92%       Past Medical History:   has a past medical history of Cerebrovascular disease, CHF (congestive heart failure) ([facility]), COPD (chronic obstructive pulmonary disease) ([facility]), Heart attack (HCC), Hyperlipidemia, Hypertension, Hypothyroidism, Neuropathy, Pacemaker, Stroke ([facility]), and Throat cancer ([facility]).    Past Surgical History:   has a past surgical history that includes Appendectomy (1961); Ankle surgery; tracheostomy; pacemaker placement; Thumb arthroscopy; and CT NEEDLE BIOPSY LUNG PERCUTANEOUS (2/15/2024).    Medications:   Scheduled Meds:   Continuous Infusions:   PRN Meds:   Home Meds:   Prior to Admission medications    Medication Sig Start Date End Date Taking? Authorizing Provider   ipratropium 0.5 mg-albuterol 2.5 mg (DUONEB) 0.5-2.5 (3) MG/3ML SOLN nebulizer solution Inhale 3 mLs into the lungs in the morning and 3 mLs in the evening. 2/20/24  Yes Sammie Lake MD   gabapentin (NEURONTIN) 800 MG tablet Take 2 tablets by mouth daily for 30 days. 1/18/24 2/17/24  Beverly Marie APRN - NP   levothyroxine (SYNTHROID) 137 MCG tablet Take 1 tablet by mouth daily 1/12/24   Beverly Marie APRN - NP   lisinopril (PRINIVIL;ZESTRIL) 5 MG tablet Take 1 tablet by mouth daily 1/11/24   Beverly Marie APRN - NP   spironolactone (ALDACTONE) 25 MG tablet Take 1 tablet by mouth daily 1/11/24   Beverly Marie

## 2024-02-22 NOTE — PROGRESS NOTES
MA Rooming Questions  Patient: Brayan Galeana  MRN: [id number]    Date: 2/22/2024        1. Do you have any new issues?   yes - pt recently hospitalized at [facility]         2. Do you need any refills on medications?    no    3. Have you had any imaging done since your last visit?   yes - MRI 1/31 + PET 1/26    4. Have you been hospitalized or seen in the emergency room since your last visit here?   yes - [facility]    5. Did the patient have a depression screening completed today? No    No data recorded     PHQ-9 Given to (if applicable):               PHQ-9 Score (if applicable):                     [] Positive     []  Negative              Does question #9 need addressed (if applicable)                     [] Yes    []  No               Amanda Kidd MA

## 2024-02-22 NOTE — PLAN OF CARE
Radiation education and handouts given. Side effects and management reviewed with pt. All questions answered and pt voices understanding .     Pt to return to [facility] tomorrow at 11:00 AM for CT/SIM for radiation planning, no prep needed.     Nursing Care Plan for Chest Radiation    Pain related to cancer diagnosis and treatment.    Interventions   Pain assessment.   Monitor pharmacological pain medication.   Teach relaxation and repositioning techniques.     Expected Outcome   Maintain patient's acceptable pain level or <5 on pain scale.       Knowledge deficit related to diagnosis and treatment plan.    Interventions   Assess patient's ability to comprehend diagnosis and treatment plan.   Provide educational materials and teaching regarding plan of care.   Provide emotional support and continued education.   Refer to psychosocial coordinator if further assistance needed.     Expected Outcome   Patient voices understanding of diagnosis and treatment plan.       Altered respiratory status related to diagnosis and treatment.    Interventions   Assess respiratory status, note changes.   Educate patient on symptoms to report to staff.   Refer to smoking cessation program if needed.     Expected Outcome   No respiratory complications, patient with SPO2 >90% or equal to baseline.       Altered skin integrity related to treatment.    Interventions   Evaluation of skin integrity at therapy site.   Advise patient on appropriate skin care.   Instruct patient on recommended lotions/ointments/creams/dressing changes to use on therapy site.     Expected Outcome   Minimize adverse skin reaction/infection at treatment site.       Altered nutritional status due to treatment process.    Interventions   Initial nutritional assessment.   Assess weight and intake during treatment.   Management of treatment side effects.   Refer to nutritional class/evaluation.     Expected Outcome   Patient's weight loss <10% from initial assessment.

## 2024-02-22 NOTE — PROGRESS NOTES
Brayan Galeana  2/22/2024    CONSULT / LEFT LUNG SBRT    There were no vitals filed for this visit.     Wt Readings from Last 3 Encounters:   02/22/24 98.9 kg (218 lb)   02/16/24 99.3 kg (219 lb)   02/15/24 98.9 kg (218 lb)        Denies c/o pain  Denies Need for Intervention    Fall Risk:    Fall risk  Shortness of Breath, Assist with Transfer/Ambulation, Provide Wheelchair PRN, Educate on Assistive Devices, and Re-Evaluate Weekly    Nutritional Alteration:  None  Mild Dysphagia  Voices Sufficient Oral Intake    Mediport: no    Pacemaker/ICD: yes, documented in progress note and Epic documents in [employer]    Implants: no    Previous XRT: yes, Head and Neck in [place] 2013    Assessment: Pt in clinic today to discuss radiation treatment options with Dr. Katz, spouse at bedside.      Past Medical History:   Diagnosis Date    Cerebrovascular disease 2021    CHF (congestive heart failure) (HCC)     COPD (chronic obstructive pulmonary disease) (HCC) 2008    Heart attack (HCC)     Hyperlipidemia     Hypertension 1997    Hypothyroidism 2013    Neuropathy 2013    Pacemaker     Stroke (HCC)     Throat cancer (HCC)        Past Surgical History:   Procedure Laterality Date    ANKLE SURGERY      APPENDECTOMY  1961    CT NEEDLE BIOPSY LUNG PERCUTANEOUS  2/15/2024    CT NEEDLE BIOPSY LUNG PERCUTANEOUS 2/15/2024 [facility] CT SCAN    PACEMAKER PLACEMENT      THUMB ARTHROSCOPY      TRACHEOSTOMY         No Known Allergies       Current Outpatient Medications:     ipratropium 0.5 mg-albuterol 2.5 mg (DUONEB) 0.5-2.5 (3) MG/3ML SOLN nebulizer solution, Inhale 3 mLs into the lungs in the morning and 3 mLs in the evening., Disp: 360 mL, Rfl: 1    gabapentin (NEURONTIN) 800 MG tablet, Take 2 tablets by mouth daily for 30 days., Disp: 60 tablet, Rfl: 0    levothyroxine (SYNTHROID) 137 MCG tablet, Take 1 tablet by mouth daily, Disp: 30 tablet, Rfl: 1    lisinopril (PRINIVIL;ZESTRIL) 5 MG tablet, Take 1 tablet by mouth daily, Disp: 90 tablet,

## 2024-02-22 NOTE — H&P
Date:2024  Name:Brayan Galeana   :1955   MR#:[id number]    SEX:male   Referring Physician:  Haily  Primary Physician:  Cynthia  Chief Complaint:  Lung mass  History of Present Illness:   Lung mass    HISTORY AND PHYSICAL  Pneumothorax [J93.9]    Past Medical History:  Past Medical History:   Diagnosis Date    Cerebrovascular disease     CHF (congestive heart failure) (HCC)     COPD (chronic obstructive pulmonary disease) (HCC)     Heart attack (HCC)     Hyperlipidemia     Hypertension     Hypothyroidism     Neuropathy     Pacemaker     Stroke (HCC)     Throat cancer (HCC)        Past Surgical History:  Past Surgical History:   Procedure Laterality Date    ANKLE SURGERY      APPENDECTOMY      CT NEEDLE BIOPSY LUNG PERCUTANEOUS  2/15/2024    CT NEEDLE BIOPSY LUNG PERCUTANEOUS 2/15/2024 [facility] CT SCAN    PACEMAKER PLACEMENT      THUMB ARTHROSCOPY      TRACHEOSTOMY         Social History:  Social History     Socioeconomic History    Marital status: Single     Spouse name: Not on file    Number of children: Not on file    Years of education: Not on file    Highest education level: Not on file   Occupational History    Not on file   Tobacco Use    Smoking status: Former     Average packs/day: 1.6 packs/day for 52.1 years (84.0 ttl pk-yrs)     Types: Cigarettes     Start date:      Passive exposure: Past    Smokeless tobacco: Never   Vaping Use    Vaping Use: Never used   Substance and Sexual Activity    Alcohol use: Not Currently    Drug use: Not Currently    Sexual activity: Yes     Partners: Female   Other Topics Concern    Not on file   Social History Narrative    Not on file     Social Determinants of Health     Financial Resource Strain: Low Risk  (1/3/2024)    Overall Financial Resource Strain (CARDIA)     Difficulty of Paying Living Expenses: Not hard at all   Food Insecurity: No Food Insecurity (2024)    Hunger Vital Sign     Worried About Running Out of Food in

## 2024-02-26 ENCOUNTER — HOSPITAL ENCOUNTER (OUTPATIENT)
Dept: RADIATION ONCOLOGY | Age: 69
Discharge: HOME | End: 2024-02-26
Payer: COMMERCIAL

## 2024-02-26 PROCEDURE — 77334 RADIATION TREATMENT AID(S): CPT | Performed by: RADIOLOGY

## 2024-02-26 PROCEDURE — 77470 SPECIAL RADIATION TREATMENT: CPT | Performed by: RADIOLOGY

## 2024-02-26 PROCEDURE — 77332 RADIATION TREATMENT AID(S): CPT | Performed by: RADIOLOGY

## 2024-02-28 PROCEDURE — NBSRV NON-BILLABLE SERVICE: Performed by: RADIOLOGY

## 2024-02-28 PROCEDURE — 77300 RADIATION THERAPY DOSE PLAN: CPT | Performed by: RADIOLOGY

## 2024-02-28 PROCEDURE — 77338 DESIGN MLC DEVICE FOR IMRT: CPT | Performed by: RADIOLOGY

## 2024-02-28 PROCEDURE — 77293 RESPIRATOR MOTION MGMT SIMUL: CPT | Performed by: RADIOLOGY

## 2024-02-28 PROCEDURE — 77301 RADIOTHERAPY DOSE PLAN IMRT: CPT | Performed by: RADIOLOGY

## 2024-02-29 ENCOUNTER — INITIAL CONSULT (OUTPATIENT)
Dept: CARDIOLOGY CLINIC | Age: 69
End: 2024-02-29
Payer: COMMERCIAL

## 2024-02-29 VITALS
WEIGHT: 217 LBS | SYSTOLIC BLOOD PRESSURE: 142 MMHG | HEART RATE: 78 BPM | DIASTOLIC BLOOD PRESSURE: 82 MMHG | HEIGHT: 69 IN | OXYGEN SATURATION: 93 % | BODY MASS INDEX: 32.14 KG/M2

## 2024-02-29 DIAGNOSIS — I10 PRIMARY HYPERTENSION: ICD-10-CM

## 2024-02-29 DIAGNOSIS — I25.10 CORONARY ARTERY DISEASE INVOLVING NATIVE CORONARY ARTERY OF NATIVE HEART WITHOUT ANGINA PECTORIS: ICD-10-CM

## 2024-02-29 DIAGNOSIS — E78.2 MIXED HYPERLIPIDEMIA: ICD-10-CM

## 2024-02-29 DIAGNOSIS — Z95.810 ICD (IMPLANTABLE CARDIOVERTER-DEFIBRILLATOR), DUAL, IN SITU: Primary | ICD-10-CM

## 2024-02-29 DIAGNOSIS — I50.20 SYSTOLIC CONGESTIVE HEART FAILURE, UNSPECIFIED HF CHRONICITY (HCC): ICD-10-CM

## 2024-02-29 PROCEDURE — 3079F DIAST BP 80-89 MM HG: CPT | Performed by: INTERNAL MEDICINE

## 2024-02-29 PROCEDURE — 93000 ELECTROCARDIOGRAM COMPLETE: CPT | Performed by: INTERNAL MEDICINE

## 2024-02-29 PROCEDURE — 1123F ACP DISCUSS/DSCN MKR DOCD: CPT | Performed by: INTERNAL MEDICINE

## 2024-02-29 PROCEDURE — 99204 OFFICE O/P NEW MOD 45 MIN: CPT | Performed by: INTERNAL MEDICINE

## 2024-02-29 PROCEDURE — 3077F SYST BP >= 140 MM HG: CPT | Performed by: INTERNAL MEDICINE

## 2024-02-29 RX ORDER — ALBUTEROL SULFATE 0.63 MG/3ML
1 SOLUTION RESPIRATORY (INHALATION) EVERY 6 HOURS PRN
COMMUNITY

## 2024-02-29 RX ORDER — CARVEDILOL 6.25 MG/1
6.25 TABLET ORAL 2 TIMES DAILY
Qty: 180 TABLET | Refills: 2 | Status: SHIPPED | OUTPATIENT
Start: 2024-02-29 | End: 2024-11-25

## 2024-02-29 NOTE — ASSESSMENT & PLAN NOTE
Last few blood pressures are reviewed.  Blood pressure is higher today than it has been and he is on the minimal dose of carvedilol.  We will increase carvedilol to 6.25 twice a day and have her follow-up in 4 weeks.

## 2024-02-29 NOTE — ASSESSMENT & PLAN NOTE
Patient is still on dual antiplatelet therapy which she is tolerating it well.  Continue aggressive risk factor modification for secondary prevention.  He is asymptomatic from the CAD point of view and his most recent ejection fraction was 25 to 30% in 2021 in outside facility.

## 2024-02-29 NOTE — PROGRESS NOTES
changes  \"Remaining review of systems reviewed and negative.     Past medical history:  has a past medical history of CAD s/p MI 1997, Cerebrovascular disease, CHF (congestive heart failure) (HCC), COPD (chronic obstructive pulmonary disease) (HCC), Heart attack (HCC), Hyperlipidemia, Hypertension, Hypothyroidism, ICD (implantable cardioverter-defibrillator), dual, in situ, Neuropathy, Stroke (HCC), and Throat cancer ([facility]).  Past surgical history:  has a past surgical history that includes Appendectomy (1961); Ankle surgery; tracheostomy; pacemaker placement; Thumb arthroscopy; and CT NEEDLE BIOPSY LUNG PERCUTANEOUS (2/15/2024).  Social History:   Social History     Tobacco Use    Smoking status: Former     Average packs/day: 1.6 packs/day for 52.2 years (84.0 ttl pk-yrs)     Types: Cigarettes     Start date: 1972     Passive exposure: Past    Smokeless tobacco: Never   Substance Use Topics    Alcohol use: Not Currently     Family history: family history includes Alcohol Abuse in his father; Cancer in his brother, brother, brother, and father; Diabetes in his brother, mother, and sister; Heart Attack in his mother; Heart Disease in his father and mother; Lung Cancer in his father.  No Known Allergies  Prior to Admission medications    Medication Sig Start Date End Date Taking? Authorizing Provider   albuterol (ACCUNEB) 0.63 MG/3ML nebulizer solution Take 3 mLs by nebulization every 6 hours as needed for Wheezing   Yes Provider, MD Sandy   carvedilol (COREG) 6.25 MG tablet Take 1 tablet by mouth 2 times daily 2/29/24 11/25/24 Yes Brennon Conley MD   gabapentin (NEURONTIN) 800 MG tablet Take 2 tablets by mouth daily for 30 days. 1/18/24 2/29/24 Yes Beverly Marie APRN - NP   levothyroxine (SYNTHROID) 137 MCG tablet Take 1 tablet by mouth daily 1/12/24  Yes Beverly Marie APRN - NP   lisinopril (PRINIVIL;ZESTRIL) 5 MG tablet Take 1 tablet by mouth daily 1/11/24  Yes Beverly Marie APRN - NP   spironolactone

## 2024-02-29 NOTE — ASSESSMENT & PLAN NOTE
ICD is working well and has remaining battery life of 3.3 years.  Will start monitoring remotely as per guidelines.  As well as radiation therapy is concerned with the left upper chest I have talked to Dr. Katz radiation oncologist and the device does not appear to be in the way directly and may get background radiation.  I discussed this with [employer] rep [place] and the recommendation is to turn the device off by applying magnet directly on it to deactivate sensing during the therapy and remove the magnet after the therapy to reactivate the device.  I have discussed this with Dr. Katz and if he needs any help they can call [employer] rep directly.  Discussed in detail with patient and he felt comfortable and verbalized understanding.  We will follow-up after his 5 radiation therapies are concluded in the office again to recheck the device.

## 2024-02-29 NOTE — ASSESSMENT & PLAN NOTE
Patient is well compensated on current decongestive therapy which includes lisinopril carvedilol and spironolactone.  His blood pressure is little elevated today.  I would increase carvedilol to 6.25 twice a day and follow-up in a month.

## 2024-02-29 NOTE — ASSESSMENT & PLAN NOTE
Clinically stable.  Continue aggressive risk factor modification for secondary prevention.  Patient is still on dual antiplatelet therapy which she is tolerating it well.  He is asymptomatic from the CAD point of view and his most recent ejection fraction was 25 to 30% in 2021 in outside facility.

## 2024-02-29 NOTE — PATIENT INSTRUCTIONS
Increase carvedilol to 6.25 twice a day and continue all other medications as discussed.  Multiple questions answered. Patient verbalizes understanding and asks relevant questions. Follow up in 4 weeks with ICD check, sooner if needed.

## 2024-03-06 ENCOUNTER — APPOINTMENT (OUTPATIENT)
Dept: CT IMAGING | Age: 69
End: 2024-03-06
Payer: COMMERCIAL

## 2024-03-06 ENCOUNTER — HOSPITAL ENCOUNTER (EMERGENCY)
Age: 69
Discharge: ANOTHER HOSPITAL | End: 2024-03-07
Attending: EMERGENCY MEDICINE
Payer: COMMERCIAL

## 2024-03-06 ENCOUNTER — APPOINTMENT (OUTPATIENT)
Dept: GENERAL RADIOLOGY | Age: 69
End: 2024-03-06
Payer: COMMERCIAL

## 2024-03-06 DIAGNOSIS — Z85.21 HISTORY OF LARYNGEAL CANCER: ICD-10-CM

## 2024-03-06 DIAGNOSIS — J96.02 ACUTE RESPIRATORY FAILURE WITH HYPOXIA AND HYPERCAPNIA (HCC): ICD-10-CM

## 2024-03-06 DIAGNOSIS — Z86.79 HISTORY OF CARDIOMYOPATHY: ICD-10-CM

## 2024-03-06 DIAGNOSIS — Z93.0 TRACHEOSTOMY PRESENT (HCC): ICD-10-CM

## 2024-03-06 DIAGNOSIS — A41.9 SEPSIS, DUE TO UNSPECIFIED ORGANISM, UNSPECIFIED WHETHER ACUTE ORGAN DYSFUNCTION PRESENT (HCC): ICD-10-CM

## 2024-03-06 DIAGNOSIS — Z85.118 HISTORY OF LUNG CANCER: ICD-10-CM

## 2024-03-06 DIAGNOSIS — J96.01 ACUTE RESPIRATORY FAILURE WITH HYPOXIA AND HYPERCAPNIA (HCC): ICD-10-CM

## 2024-03-06 DIAGNOSIS — Z86.79 HISTORY OF CORONARY ARTERY DISEASE: ICD-10-CM

## 2024-03-06 DIAGNOSIS — J18.9 PNEUMONIA OF RIGHT LOWER LOBE DUE TO INFECTIOUS ORGANISM: ICD-10-CM

## 2024-03-06 DIAGNOSIS — J44.1 COPD EXACERBATION (HCC): Primary | ICD-10-CM

## 2024-03-06 LAB
ALBUMIN SERPL-MCNC: 4 GM/DL (ref 3.4–5)
ALP BLD-CCNC: 123 IU/L (ref 40–129)
ALT SERPL-CCNC: 12 U/L (ref 10–40)
ANION GAP SERPL CALCULATED.3IONS-SCNC: 11 MMOL/L (ref 7–16)
APTT: 33.4 SECONDS (ref 25.1–37.1)
B PARAP IS1001 DNA NPH QL NAA+NON-PROBE: NOT DETECTED
B PERT.PT PRMT NPH QL NAA+NON-PROBE: NOT DETECTED
BASE EXCESS MIXED: 3.1 (ref 0–3)
BASE EXCESS MIXED: 4 (ref 0–3)
BASE EXCESS: ABNORMAL (ref 0–3)
BASE EXCESS: ABNORMAL (ref 0–3)
BASOPHILS ABSOLUTE: 0.2 K/CU MM
BASOPHILS RELATIVE PERCENT: 1.3 % (ref 0–1)
BILIRUB SERPL-MCNC: 0.5 MG/DL (ref 0–1)
BILIRUBIN DIRECT: 0.2 MG/DL (ref 0–0.3)
BILIRUBIN, INDIRECT: 0.3 MG/DL (ref 0–0.7)
BUN SERPL-MCNC: 10 MG/DL (ref 6–23)
C PNEUM DNA NPH QL NAA+NON-PROBE: NOT DETECTED
CALCIUM SERPL-MCNC: 7.7 MG/DL (ref 8.3–10.6)
CHLORIDE BLD-SCNC: 99 MMOL/L (ref 99–110)
CO2 CONTENT: 34.5 MMOL/L (ref 21–32)
CO2 CONTENT: 35.8 MMOL/L (ref 21–32)
CO2: 30 MMOL/L (ref 21–32)
CREAT SERPL-MCNC: 0.9 MG/DL (ref 0.9–1.3)
EOSINOPHILS ABSOLUTE: 1.8 K/CU MM
EOSINOPHILS RELATIVE PERCENT: 12.4 % (ref 0–3)
FLUAV H1 RNA NPH QL NAA+NON-PROBE: NOT DETECTED
FLUAV RNA NPH QL NAA+NON-PROBE: NOT DETECTED
FLUBV RNA NPH QL NAA+NON-PROBE: NOT DETECTED
GFR SERPL CREATININE-BSD FRML MDRD: >60 ML/MIN/1.73M2
GLUCOSE SERPL-MCNC: 113 MG/DL (ref 70–99)
HADV DNA NPH QL NAA+NON-PROBE: NOT DETECTED
HCO3 VENOUS: 32.4 MMOL/L (ref 22–29)
HCO3 VENOUS: 33.4 MMOL/L (ref 22–29)
HCOV 229E RNA NPH QL NAA+NON-PROBE: NOT DETECTED
HCOV HKU1 RNA NPH QL NAA+NON-PROBE: NOT DETECTED
HCOV OC43 RNA NPH QL NAA+NON-PROBE: NOT DETECTED
HEMOGLOBIN: 13.8 GM/DL (ref 13.5–18)
HMPV RNA NPH QL NAA+NON-PROBE: NOT DETECTED
HPIV1 RNA NPH QL NAA+NON-PROBE: NOT DETECTED
HPIV3 RNA NPH QL NAA+NON-PROBE: NOT DETECTED
HPIV4 RNA NPH QL NAA+NON-PROBE: NOT DETECTED
INR BLD: 1 INDEX
LACTIC ACID, SEPSIS: 0.7 MMOL/L (ref 0.4–2)
LYMPHOCYTES ABSOLUTE: 2.4 K/CU MM
LYMPHOCYTES RELATIVE PERCENT: 17 % (ref 24–44)
M PNEUMO DNA NPH QL NAA+NON-PROBE: NOT DETECTED
MCH RBC QN AUTO: 30.5 PG (ref 27–31)
MCHC RBC AUTO-ENTMCNC: 32.2 % (ref 32–36)
MCV RBC AUTO: 94.5 FL (ref 78–100)
MONOCYTES ABSOLUTE: 0.9 K/CU MM
MONOCYTES RELATIVE PERCENT: 6.6 % (ref 0–4)
O2 SAT, VEN: 45.9 % (ref 50–70)
O2 SAT, VEN: 83 % (ref 50–70)
PCO2, VEN: 66.3 MMHG (ref 41–51)
PCO2, VEN: 77.1 MMHG (ref 41–51)
PH VENOUS: 7.25 (ref 7.32–7.43)
PH VENOUS: 7.3 (ref 7.32–7.43)
PLATELET # BLD: 284 K/CU MM (ref 140–440)
PMV BLD AUTO: 8.8 FL (ref 7.5–11.1)
PO2, VEN: 28.9 MMHG (ref 28–48)
PO2, VEN: 57.9 MMHG (ref 28–48)
POTASSIUM SERPL-SCNC: 4.3 MMOL/L (ref 3.5–5.1)
PRO-BNP: 185.4 PG/ML
PROTHROMBIN TIME: 13.4 SECONDS (ref 11.7–14.5)
RBC # BLD: 4.53 M/CU MM (ref 4.6–6.2)
RSV RNA NPH QL NAA+NON-PROBE: NOT DETECTED
RV+EV RNA NPH QL NAA+NON-PROBE: NOT DETECTED
SEGMENTED NEUTROPHILS ABSOLUTE COUNT: 8.8 K/CU MM
SEGMENTED NEUTROPHILS RELATIVE PERCENT: 62.3 % (ref 36–66)
SODIUM BLD-SCNC: 140 MMOL/L (ref 135–145)
SOURCE, BLOOD GAS: ABNORMAL
SOURCE, BLOOD GAS: ABNORMAL
TOTAL IMMATURE NEUTOROPHIL: 0.05 K/CU MM
TOTAL PROTEIN: 6.5 GM/DL (ref 6.4–8.2)
TROPONIN, HIGH SENSITIVITY: 7 NG/L (ref 0–22)
WBC # BLD: 14.1 K/CU MM (ref 4–10.5)

## 2024-03-06 PROCEDURE — 96365 THER/PROPH/DIAG IV INF INIT: CPT

## 2024-03-06 PROCEDURE — 80053 COMPREHEN METABOLIC PANEL: CPT

## 2024-03-06 PROCEDURE — 6360000002 HC RX W HCPCS: Performed by: EMERGENCY MEDICINE

## 2024-03-06 PROCEDURE — 84484 ASSAY OF TROPONIN QUANT: CPT

## 2024-03-06 PROCEDURE — 2580000003 HC RX 258: Performed by: EMERGENCY MEDICINE

## 2024-03-06 PROCEDURE — 99285 EMERGENCY DEPT VISIT HI MDM: CPT

## 2024-03-06 PROCEDURE — 83605 ASSAY OF LACTIC ACID: CPT

## 2024-03-06 PROCEDURE — 87077 CULTURE AEROBIC IDENTIFY: CPT

## 2024-03-06 PROCEDURE — 71275 CT ANGIOGRAPHY CHEST: CPT

## 2024-03-06 PROCEDURE — 6360000004 HC RX CONTRAST MEDICATION: Performed by: EMERGENCY MEDICINE

## 2024-03-06 PROCEDURE — 87040 BLOOD CULTURE FOR BACTERIA: CPT

## 2024-03-06 PROCEDURE — 82805 BLOOD GASES W/O2 SATURATION: CPT

## 2024-03-06 PROCEDURE — 6370000000 HC RX 637 (ALT 250 FOR IP): Performed by: EMERGENCY MEDICINE

## 2024-03-06 PROCEDURE — 96375 TX/PRO/DX INJ NEW DRUG ADDON: CPT

## 2024-03-06 PROCEDURE — 93005 ELECTROCARDIOGRAM TRACING: CPT | Performed by: EMERGENCY MEDICINE

## 2024-03-06 PROCEDURE — 85025 COMPLETE CBC W/AUTO DIFF WBC: CPT

## 2024-03-06 PROCEDURE — 94640 AIRWAY INHALATION TREATMENT: CPT

## 2024-03-06 PROCEDURE — 85610 PROTHROMBIN TIME: CPT

## 2024-03-06 PROCEDURE — 83880 ASSAY OF NATRIURETIC PEPTIDE: CPT

## 2024-03-06 PROCEDURE — 0202U NFCT DS 22 TRGT SARS-COV-2: CPT

## 2024-03-06 PROCEDURE — 96368 THER/DIAG CONCURRENT INF: CPT

## 2024-03-06 PROCEDURE — 85730 THROMBOPLASTIN TIME PARTIAL: CPT

## 2024-03-06 PROCEDURE — 71045 X-RAY EXAM CHEST 1 VIEW: CPT

## 2024-03-06 PROCEDURE — 82248 BILIRUBIN DIRECT: CPT

## 2024-03-06 PROCEDURE — 87205 SMEAR GRAM STAIN: CPT

## 2024-03-06 PROCEDURE — 87070 CULTURE OTHR SPECIMN AEROBIC: CPT

## 2024-03-06 RX ORDER — DEXAMETHASONE SODIUM PHOSPHATE 10 MG/ML
6 INJECTION, SOLUTION INTRAMUSCULAR; INTRAVENOUS ONCE
Status: COMPLETED | OUTPATIENT
Start: 2024-03-06 | End: 2024-03-06

## 2024-03-06 RX ORDER — MAGNESIUM SULFATE IN WATER 40 MG/ML
2000 INJECTION, SOLUTION INTRAVENOUS ONCE
Status: COMPLETED | OUTPATIENT
Start: 2024-03-06 | End: 2024-03-06

## 2024-03-06 RX ORDER — LEVOTHYROXINE SODIUM 137 UG/1
137 TABLET ORAL DAILY
Qty: 30 TABLET | Refills: 0 | Status: SHIPPED | OUTPATIENT
Start: 2024-03-06

## 2024-03-06 RX ORDER — IPRATROPIUM BROMIDE AND ALBUTEROL SULFATE 2.5; .5 MG/3ML; MG/3ML
SOLUTION RESPIRATORY (INHALATION)
Status: DISCONTINUED
Start: 2024-03-06 | End: 2024-03-07 | Stop reason: HOSPADM

## 2024-03-06 RX ORDER — IPRATROPIUM BROMIDE AND ALBUTEROL SULFATE 2.5; .5 MG/3ML; MG/3ML
1 SOLUTION RESPIRATORY (INHALATION) ONCE
Status: COMPLETED | OUTPATIENT
Start: 2024-03-06 | End: 2024-03-06

## 2024-03-06 RX ORDER — 0.9 % SODIUM CHLORIDE 0.9 %
500 INTRAVENOUS SOLUTION INTRAVENOUS ONCE
Status: COMPLETED | OUTPATIENT
Start: 2024-03-06 | End: 2024-03-06

## 2024-03-06 RX ADMIN — CEFTRIAXONE SODIUM 1000 MG: 1 INJECTION, POWDER, FOR SOLUTION INTRAMUSCULAR; INTRAVENOUS at 22:38

## 2024-03-06 RX ADMIN — AZITHROMYCIN MONOHYDRATE 500 MG: 500 INJECTION, POWDER, LYOPHILIZED, FOR SOLUTION INTRAVENOUS at 21:44

## 2024-03-06 RX ADMIN — IOPAMIDOL 75 ML: 755 INJECTION, SOLUTION INTRAVENOUS at 22:15

## 2024-03-06 RX ADMIN — MAGNESIUM SULFATE HEPTAHYDRATE 2000 MG: 40 INJECTION, SOLUTION INTRAVENOUS at 21:23

## 2024-03-06 RX ADMIN — SODIUM CHLORIDE 500 ML: 9 INJECTION, SOLUTION INTRAVENOUS at 22:37

## 2024-03-06 RX ADMIN — IPRATROPIUM BROMIDE AND ALBUTEROL SULFATE 1 DOSE: 2.5; .5 SOLUTION RESPIRATORY (INHALATION) at 21:45

## 2024-03-06 RX ADMIN — DEXAMETHASONE SODIUM PHOSPHATE 6 MG: 10 INJECTION, SOLUTION INTRAMUSCULAR; INTRAVENOUS at 21:24

## 2024-03-06 ASSESSMENT — PAIN DESCRIPTION - LOCATION: LOCATION: CHEST

## 2024-03-06 ASSESSMENT — PAIN SCALES - GENERAL: PAINLEVEL_OUTOF10: 5

## 2024-03-06 ASSESSMENT — PAIN DESCRIPTION - FREQUENCY: FREQUENCY: CONTINUOUS

## 2024-03-06 NOTE — TELEPHONE ENCOUNTER
are not intended for use in patients <18 years of age.          eGFR results are calculated without a race factor using the 2021 CKD-EPI equation.  Careful clinical correlation is recommended, particularly when comparing to results   calculated using previous equations.  The CKD-EPI equation is less accurate in patients with extremes of muscle mass, extra-renal   metabolism of creatine, excessive creatine ingestion, or following therapy that affects   renal tubular secretion.          CMP:  Sodium   Date Value Ref Range Status   02/17/2024 135 135 - 145 MMOL/L Final     Potassium   Date Value Ref Range Status   02/17/2024 4.2 3.5 - 5.1 MMOL/L Final     Chloride   Date Value Ref Range Status   02/17/2024 99 99 - 110 mMol/L Final     CO2   Date Value Ref Range Status   02/17/2024 25 21 - 32 MMOL/L Final     BUN   Date Value Ref Range Status   02/17/2024 16 6 - 23 MG/DL Final     Creatinine   Date Value Ref Range Status   02/17/2024 0.8 (L) 0.9 - 1.3 MG/DL Final     Glucose   Date Value Ref Range Status   02/17/2024 140 (H) 70 - 99 MG/DL Final     Calcium   Date Value Ref Range Status   02/17/2024 8.5 8.3 - 10.6 MG/DL Final     Total Protein   Date Value Ref Range Status   02/17/2024 6.2 (L) 6.4 - 8.2 GM/DL Final     Albumin   Date Value Ref Range Status   02/17/2024 4.2 3.4 - 5.0 GM/DL Final     Total Bilirubin   Date Value Ref Range Status   02/17/2024 0.9 0.0 - 1.0 MG/DL Final     Alkaline Phosphatase   Date Value Ref Range Status   02/17/2024 99 40 - 128 IU/L Final     AST   Date Value Ref Range Status   02/17/2024 15 15 - 37 IU/L Final     ALT   Date Value Ref Range Status   02/17/2024 13 10 - 40 U/L Final     Est, Glom Filt Rate   Date Value Ref Range Status   02/17/2024 >60 >60 mL/min/1.73m2 Final     Comment:             These results are not intended for use in patients <18 years of age.          eGFR results are calculated without a race factor using the 2021 CKD-EPI equation.  Careful clinical correlation is

## 2024-03-07 ENCOUNTER — APPOINTMENT (OUTPATIENT)
Dept: ULTRASOUND IMAGING | Age: 69
End: 2024-03-07
Attending: INTERNAL MEDICINE
Payer: COMMERCIAL

## 2024-03-07 ENCOUNTER — HOSPITAL ENCOUNTER (INPATIENT)
Age: 69
LOS: 2 days | Discharge: HOME | End: 2024-03-09
Attending: INTERNAL MEDICINE | Admitting: INTERNAL MEDICINE
Payer: COMMERCIAL

## 2024-03-07 ENCOUNTER — HOSPITAL ENCOUNTER (OUTPATIENT)
Dept: RADIATION ONCOLOGY | Age: 69
Discharge: HOME | End: 2024-03-07

## 2024-03-07 VITALS
HEIGHT: 69 IN | OXYGEN SATURATION: 95 % | WEIGHT: 217 LBS | RESPIRATION RATE: 17 BRPM | TEMPERATURE: 98.1 F | HEART RATE: 82 BPM | BODY MASS INDEX: 32.14 KG/M2 | DIASTOLIC BLOOD PRESSURE: 65 MMHG | SYSTOLIC BLOOD PRESSURE: 118 MMHG

## 2024-03-07 PROBLEM — J96.01 ACUTE RESPIRATORY FAILURE WITH HYPOXIA (HCC): Status: ACTIVE | Noted: 2024-03-07

## 2024-03-07 LAB
BASE EXCESS MIXED: 1 (ref 0–3)
COMMENT: ABNORMAL
EKG ATRIAL RATE: 88 BPM
EKG DIAGNOSIS: NORMAL
EKG P AXIS: 56 DEGREES
EKG P-R INTERVAL: 162 MS
EKG Q-T INTERVAL: 370 MS
EKG QTC CALCULATION (BAZETT): 447 MS
EKG R AXIS: 39 DEGREES
EKG T AXIS: 50 DEGREES
HCO3 VENOUS: 27.9 MMOL/L (ref 22–29)
O2 SAT, VEN: 93.2 % (ref 50–70)
PCO2, VEN: 53 MMHG (ref 41–51)
PH VENOUS: 7.33 (ref 7.32–7.43)
PO2, VEN: 88 MMHG (ref 28–48)

## 2024-03-07 PROCEDURE — 94761 N-INVAS EAR/PLS OXIMETRY MLT: CPT

## 2024-03-07 PROCEDURE — 6370000000 HC RX 637 (ALT 250 FOR IP): Performed by: INTERNAL MEDICINE

## 2024-03-07 PROCEDURE — 6360000002 HC RX W HCPCS: Performed by: INTERNAL MEDICINE

## 2024-03-07 PROCEDURE — 2700000000 HC OXYGEN THERAPY PER DAY

## 2024-03-07 PROCEDURE — 82805 BLOOD GASES W/O2 SATURATION: CPT

## 2024-03-07 PROCEDURE — 80048 BASIC METABOLIC PNL TOTAL CA: CPT

## 2024-03-07 PROCEDURE — 76705 ECHO EXAM OF ABDOMEN: CPT

## 2024-03-07 PROCEDURE — 94640 AIRWAY INHALATION TREATMENT: CPT

## 2024-03-07 PROCEDURE — 93010 ELECTROCARDIOGRAM REPORT: CPT | Performed by: INTERNAL MEDICINE

## 2024-03-07 PROCEDURE — 2060000000 HC ICU INTERMEDIATE R&B

## 2024-03-07 PROCEDURE — 36415 COLL VENOUS BLD VENIPUNCTURE: CPT

## 2024-03-07 PROCEDURE — 6370000000 HC RX 637 (ALT 250 FOR IP): Performed by: STUDENT IN AN ORGANIZED HEALTH CARE EDUCATION/TRAINING PROGRAM

## 2024-03-07 PROCEDURE — 2580000003 HC RX 258: Performed by: INTERNAL MEDICINE

## 2024-03-07 RX ORDER — SODIUM CHLORIDE 0.9 % (FLUSH) 0.9 %
5-40 SYRINGE (ML) INJECTION EVERY 12 HOURS SCHEDULED
Status: DISCONTINUED | OUTPATIENT
Start: 2024-03-07 | End: 2024-03-09 | Stop reason: HOSPADM

## 2024-03-07 RX ORDER — ACETAMINOPHEN 650 MG/1
650 SUPPOSITORY RECTAL EVERY 6 HOURS PRN
Status: DISCONTINUED | OUTPATIENT
Start: 2024-03-07 | End: 2024-03-09 | Stop reason: HOSPADM

## 2024-03-07 RX ORDER — ENOXAPARIN SODIUM 100 MG/ML
40 INJECTION SUBCUTANEOUS DAILY
Status: DISCONTINUED | OUTPATIENT
Start: 2024-03-07 | End: 2024-03-09 | Stop reason: HOSPADM

## 2024-03-07 RX ORDER — POLYETHYLENE GLYCOL 3350 17 G/17G
17 POWDER, FOR SOLUTION ORAL DAILY PRN
Status: DISCONTINUED | OUTPATIENT
Start: 2024-03-07 | End: 2024-03-09 | Stop reason: HOSPADM

## 2024-03-07 RX ORDER — ONDANSETRON 2 MG/ML
4 INJECTION INTRAMUSCULAR; INTRAVENOUS EVERY 6 HOURS PRN
Status: DISCONTINUED | OUTPATIENT
Start: 2024-03-07 | End: 2024-03-09 | Stop reason: HOSPADM

## 2024-03-07 RX ORDER — CLOPIDOGREL BISULFATE 75 MG/1
75 TABLET ORAL DAILY
Status: DISCONTINUED | OUTPATIENT
Start: 2024-03-07 | End: 2024-03-09 | Stop reason: HOSPADM

## 2024-03-07 RX ORDER — CARVEDILOL 6.25 MG/1
6.25 TABLET ORAL 2 TIMES DAILY WITH MEALS
Status: DISCONTINUED | OUTPATIENT
Start: 2024-03-07 | End: 2024-03-09 | Stop reason: HOSPADM

## 2024-03-07 RX ORDER — ACETAMINOPHEN 325 MG/1
650 TABLET ORAL EVERY 6 HOURS PRN
Status: DISCONTINUED | OUTPATIENT
Start: 2024-03-07 | End: 2024-03-09 | Stop reason: HOSPADM

## 2024-03-07 RX ORDER — ASPIRIN 81 MG/1
81 TABLET, CHEWABLE ORAL DAILY
Status: DISCONTINUED | OUTPATIENT
Start: 2024-03-07 | End: 2024-03-09 | Stop reason: HOSPADM

## 2024-03-07 RX ORDER — PREDNISONE 10 MG/1
40 TABLET ORAL DAILY
Status: DISCONTINUED | OUTPATIENT
Start: 2024-03-07 | End: 2024-03-07

## 2024-03-07 RX ORDER — SPIRONOLACTONE 50 MG/1
25 TABLET, FILM COATED ORAL DAILY
Status: DISCONTINUED | OUTPATIENT
Start: 2024-03-07 | End: 2024-03-09 | Stop reason: HOSPADM

## 2024-03-07 RX ORDER — SODIUM CHLORIDE 9 MG/ML
INJECTION, SOLUTION INTRAVENOUS PRN
Status: DISCONTINUED | OUTPATIENT
Start: 2024-03-07 | End: 2024-03-09 | Stop reason: HOSPADM

## 2024-03-07 RX ORDER — GABAPENTIN 800 MG/1
1600 TABLET ORAL DAILY
Status: DISCONTINUED | OUTPATIENT
Start: 2024-03-07 | End: 2024-03-07

## 2024-03-07 RX ORDER — ATORVASTATIN CALCIUM 40 MG/1
80 TABLET, FILM COATED ORAL DAILY
Status: DISCONTINUED | OUTPATIENT
Start: 2024-03-07 | End: 2024-03-09 | Stop reason: HOSPADM

## 2024-03-07 RX ORDER — IPRATROPIUM BROMIDE AND ALBUTEROL SULFATE 2.5; .5 MG/3ML; MG/3ML
1 SOLUTION RESPIRATORY (INHALATION)
Status: DISCONTINUED | OUTPATIENT
Start: 2024-03-07 | End: 2024-03-09 | Stop reason: HOSPADM

## 2024-03-07 RX ORDER — LISINOPRIL 5 MG/1
5 TABLET ORAL DAILY
Status: DISCONTINUED | OUTPATIENT
Start: 2024-03-07 | End: 2024-03-09 | Stop reason: HOSPADM

## 2024-03-07 RX ORDER — GABAPENTIN 400 MG/1
800 CAPSULE ORAL 2 TIMES DAILY
Status: DISCONTINUED | OUTPATIENT
Start: 2024-03-07 | End: 2024-03-07

## 2024-03-07 RX ORDER — PANTOPRAZOLE SODIUM 20 MG/1
20 TABLET, DELAYED RELEASE ORAL
Status: DISCONTINUED | OUTPATIENT
Start: 2024-03-07 | End: 2024-03-09 | Stop reason: HOSPADM

## 2024-03-07 RX ORDER — SODIUM CHLORIDE 0.9 % (FLUSH) 0.9 %
5-40 SYRINGE (ML) INJECTION PRN
Status: DISCONTINUED | OUTPATIENT
Start: 2024-03-07 | End: 2024-03-09 | Stop reason: HOSPADM

## 2024-03-07 RX ORDER — ONDANSETRON 4 MG/1
4 TABLET, ORALLY DISINTEGRATING ORAL EVERY 8 HOURS PRN
Status: DISCONTINUED | OUTPATIENT
Start: 2024-03-07 | End: 2024-03-09 | Stop reason: HOSPADM

## 2024-03-07 RX ORDER — GUAIFENESIN 600 MG/1
600 TABLET, EXTENDED RELEASE ORAL 2 TIMES DAILY
Status: DISCONTINUED | OUTPATIENT
Start: 2024-03-07 | End: 2024-03-09 | Stop reason: HOSPADM

## 2024-03-07 RX ORDER — GABAPENTIN 400 MG/1
800 CAPSULE ORAL 2 TIMES DAILY
Status: DISCONTINUED | OUTPATIENT
Start: 2024-03-07 | End: 2024-03-09 | Stop reason: HOSPADM

## 2024-03-07 RX ADMIN — LEVOTHYROXINE SODIUM 137 MCG: 25 TABLET ORAL at 06:04

## 2024-03-07 RX ADMIN — CLOPIDOGREL BISULFATE 75 MG: 75 TABLET ORAL at 09:17

## 2024-03-07 RX ADMIN — CEFTRIAXONE 1000 MG: 1 INJECTION, POWDER, FOR SOLUTION INTRAMUSCULAR; INTRAVENOUS at 22:16

## 2024-03-07 RX ADMIN — CARVEDILOL 6.25 MG: 6.25 TABLET, FILM COATED ORAL at 09:17

## 2024-03-07 RX ADMIN — WATER 40 MG: 1 INJECTION INTRAMUSCULAR; INTRAVENOUS; SUBCUTANEOUS at 20:54

## 2024-03-07 RX ADMIN — SODIUM CHLORIDE 25 ML: 9 INJECTION, SOLUTION INTRAVENOUS at 22:15

## 2024-03-07 RX ADMIN — WATER 40 MG: 1 INJECTION INTRAMUSCULAR; INTRAVENOUS; SUBCUTANEOUS at 12:37

## 2024-03-07 RX ADMIN — PREDNISONE 40 MG: 10 TABLET ORAL at 09:18

## 2024-03-07 RX ADMIN — ATORVASTATIN CALCIUM 80 MG: 40 TABLET, FILM COATED ORAL at 09:17

## 2024-03-07 RX ADMIN — IPRATROPIUM BROMIDE AND ALBUTEROL SULFATE 1 DOSE: 2.5; .5 SOLUTION RESPIRATORY (INHALATION) at 08:36

## 2024-03-07 RX ADMIN — SODIUM CHLORIDE 25 ML: 9 INJECTION, SOLUTION INTRAVENOUS at 21:06

## 2024-03-07 RX ADMIN — LISINOPRIL 5 MG: 5 TABLET ORAL at 09:18

## 2024-03-07 RX ADMIN — GUAIFENESIN 600 MG: 600 TABLET, EXTENDED RELEASE ORAL at 12:37

## 2024-03-07 RX ADMIN — ASPIRIN 81 MG: 81 TABLET, CHEWABLE ORAL at 09:18

## 2024-03-07 RX ADMIN — AZITHROMYCIN MONOHYDRATE 500 MG: 500 INJECTION, POWDER, LYOPHILIZED, FOR SOLUTION INTRAVENOUS at 21:06

## 2024-03-07 RX ADMIN — IPRATROPIUM BROMIDE AND ALBUTEROL SULFATE 1 DOSE: 2.5; .5 SOLUTION RESPIRATORY (INHALATION) at 15:39

## 2024-03-07 RX ADMIN — PANTOPRAZOLE SODIUM 20 MG: 20 TABLET, DELAYED RELEASE ORAL at 06:05

## 2024-03-07 RX ADMIN — SODIUM CHLORIDE, PRESERVATIVE FREE 10 ML: 5 INJECTION INTRAVENOUS at 09:31

## 2024-03-07 RX ADMIN — IPRATROPIUM BROMIDE AND ALBUTEROL SULFATE 1 DOSE: 2.5; .5 SOLUTION RESPIRATORY (INHALATION) at 20:25

## 2024-03-07 RX ADMIN — CARVEDILOL 6.25 MG: 6.25 TABLET, FILM COATED ORAL at 15:26

## 2024-03-07 RX ADMIN — GUAIFENESIN 600 MG: 600 TABLET, EXTENDED RELEASE ORAL at 20:55

## 2024-03-07 RX ADMIN — GABAPENTIN 800 MG: 400 CAPSULE ORAL at 15:26

## 2024-03-07 RX ADMIN — SPIRONOLACTONE 25 MG: 50 TABLET ORAL at 09:18

## 2024-03-07 RX ADMIN — ENOXAPARIN SODIUM 40 MG: 100 INJECTION SUBCUTANEOUS at 09:18

## 2024-03-07 RX ADMIN — SODIUM CHLORIDE, PRESERVATIVE FREE 10 ML: 5 INJECTION INTRAVENOUS at 20:54

## 2024-03-07 ASSESSMENT — PAIN SCALES - GENERAL
PAINLEVEL_OUTOF10: 1
PAINLEVEL_OUTOF10: 2
PAINLEVEL_OUTOF10: 0
PAINLEVEL_OUTOF10: 5
PAINLEVEL_OUTOF10: 3
PAINLEVEL_OUTOF10: 2

## 2024-03-07 ASSESSMENT — PAIN DESCRIPTION - FREQUENCY
FREQUENCY: CONTINUOUS
FREQUENCY: CONTINUOUS

## 2024-03-07 ASSESSMENT — PAIN DESCRIPTION - PAIN TYPE: TYPE: ACUTE PAIN

## 2024-03-07 ASSESSMENT — PAIN DESCRIPTION - ORIENTATION
ORIENTATION: RIGHT;ANTERIOR
ORIENTATION: RIGHT;POSTERIOR

## 2024-03-07 ASSESSMENT — PAIN DESCRIPTION - LOCATION
LOCATION: CHEST
LOCATION: CHEST

## 2024-03-07 ASSESSMENT — PAIN DESCRIPTION - ONSET
ONSET: SUDDEN
ONSET: ON-GOING

## 2024-03-07 ASSESSMENT — PAIN SCALES - WONG BAKER
WONGBAKER_NUMERICALRESPONSE: 2
WONGBAKER_NUMERICALRESPONSE: 2
WONGBAKER_NUMERICALRESPONSE: 0
WONGBAKER_NUMERICALRESPONSE: 2

## 2024-03-07 ASSESSMENT — PAIN DESCRIPTION - DESCRIPTORS
DESCRIPTORS: ACHING;PRESSURE
DESCRIPTORS: ACHING;PRESSURE

## 2024-03-07 NOTE — ED PROVIDER NOTES
nonelevated.  Lactic acid is nonelevated.  Chest x-ray is without acute cardiopulmonary pathology.  Respiratory panel is negative.  His repeat VBG now shows pH of 7.30 improved from 7.25 and pCO2 of 66 improved from 77.  Need he is currently on Venturi mask at 35% CTA of the chest was negative for pulmonary embolism but does demonstrate right lower lobe pneumonia.  At this time Rocephin ordered in addition to the patient's initial azithromycin.  I do not feel he requires broad-spectrum antibiotic coverage given DRIP score of only 2.  However, he will require hospitalization given the acute respiratory failure with COPD exacerbation and pneumonia.  I feel patient will be best served for hospitalization [facility] given his comorbidities and tracheostomy status.    Patient discussed with Dr. Aaron of hospitalist team at [facility] the who agreed to hospitalize patient for further management.  Stable for transfer    Critical Care Time: I have personally provided 45 minutes of critical care time (excluding separately listed billable procedures) through obtaining a history, examining the patient, reviewing relevant medical records, discussing care with family and/or other providers, performing multiple reassessments and directing care.      Amount and/or Complexity of Data Reviewed  Clinical lab tests: reviewed  Decide to obtain previous medical records or to obtain history from someone other than the patient: yes       -  Patient seen and evaluated in the emergency department.  -  Triage and nursing notes reviewed and incorporated.  -  Old chart records reviewed and incorporated.  -  Work-up included:  See above      Appropriate PPE utilized as indicated for entire patient encounter?  Time of Disposition: See timeline      Independent Imaging Interpretation by me: Chest x ray     EKG (if obtained):  Please see above interpretation     Chronic conditions affecting care: COPD, history of CHF, active lung cancer, history of

## 2024-03-07 NOTE — CONSULTS
[facility] [place] [id number]                              CONSULTATION      PATIENT NAME: JACQUELYN ARANDA              : 1955  MED REC NO: [id number]                      ROOM:   ACCOUNT NO: [id number]                       ADMIT DATE: 2024  PROVIDER: Ruperto Johansen MD      REFERRING PHYSICIAN:  XIOMARA NATHAN    HISTORY OF PRESENT ILLNESS:  The patient is a 68-year-old gentleman with multiple medical problems including COPD; history of laryngeal cancer, status post laryngectomy, radiation therapy with stoma; chronic respiratory failure, on home oxygen, who came to the emergency room with complaints of increasing shortness of breath and cough productive of yellowish phlegm.  He denied any hemoptysis.  He denied any fever or chills.  He denied any nausea or vomiting.  He denied any chest pain.  The patient was recently admitted for lung biopsy and had pneumothorax and required chest tube.  The patient follows up with Dr. Acevedo.  The patient's lung biopsy came back moderately differentiated squamous cell carcinoma and he is being followed by Dr. Acevedo.    PAST MEDICAL HISTORY:  Significant for COPD; congestive heart failure; hypertension; hyperlipidemia; history of laryngeal cancer, status post laryngectomy with stoma; and recent diagnosis of squamous cell carcinoma.    PAST SURGICAL HISTORY:  Remarkable for appendectomy, needle biopsy of the lung, pacemaker placement, tracheostomy.    FAMILY HISTORY:  Reveals that his mother had diabetes, coronary artery disease.  His father had cancer, coronary artery disease, and lung cancer.    SOCIAL HISTORY:  Reveals that he quit smoking, but has 84-pack-year smoking history.  No history of alcohol or drug abuse.    MEDICATIONS:  Reviewed.    ALLERGIES:  HE HAS NO KNOWN ALLERGIES.      REVIEW OF SYSTEMS:  10 to 14-point review of systems were reviewed and are negative except for  10:46:52     T:  03/07/2024 14:10:57     MAR/RACHEL  Job #:  [id number]     Doc#:  [id number]

## 2024-03-07 NOTE — ED NOTES
RN called Roby Disla RN [facility] to ask status of bed for patient. He informs RN that bed was just assigned 5 minutes ago. RN will look for call from [facility] concerning bed assignment and transportation time.

## 2024-03-07 NOTE — H&P
History and Physical      Name:  Brayan Galeana /Age/Sex: 1955  (68 y.o. male)   MRN & CSN:  [id number] & [id number] Encounter Date/Time: 3/7/2024 5:27 AM EST   Location:  -A PCP: Beverly Marie APRN - NP       Hospital Day: 1    Assessment and Plan:     #.  Acute on chronic hypoxic and hypercapnic respiratory failure  -VBG-pH 7.25, pCO2 77.1, pO2 57.9, HCO3 33.4 on presentation  -Repeat VBG pH 7.30, pCO2 66.3, pO2 28.9, HCO3 32.4.  -Patient is currently on trach mask at 7 L/min.  -Repeat VBG  -Respiratory viral panel negative  -Consult pulmonology    #.  Pneumonia  -CTA chest-no PE, airspace density in the posterior right lower lobe with a small pleural effusion, possible infiltrate  -Patient received ceftriaxone, azithromycin-continue  -Blood cultures done in ED  -Lactic acid 0.7  -Respiratory culture in process    #.  COPD with acute exacerbation  -Patient was recently discharged on home oxygen  -Continue p.o. prednisone, DuoNeb, antibiotics    #.  Recent admission-2/ for left pneumothorax s/p biopsy    #.  Lung nodule-Path report-moderately differentiated squamous cell carcinoma  -Patient following with Dr. Acevedo    #.  History of neoplasm of larynx s/p laryngectomy-  -s/p laryngectomy and radiation    #.  Chronic systolic CHF  -Echo-2021-EF 25-30%-s/p ICD  -Patient on aspirin, carvedilol, atorvastatin, lisinopril, spironolactone    #.  Coronary artery disease  -Patient is on aspirin, Plavix, carvedilol, atorvastatin, lisinopril    #.  Hypertension-patient is on lisinopril, carvedilol    #.  Hypothyroidism-on levothyroxine    #.  GERD-on omeprazole    #.  Chronic pain-on gabapentin    Disposition:   Current Living situation: home    Diet Regular diet   DVT Prophylaxis [x] Lovenox   Code Status Full Code   Surrogate Decision Maker/ POA      History from:   EMR, patient.    History of Present Illness:     Chief Complaint: <principal problem not specified>  Brayan Galeana is a  mass densities, or areas of airspace consolidation are seen. There is a 1.1 cm left hilar node (axial image 132). There is a medial right infrahilar node seen, which measures 1.1 cm in short axis (axial image 119). No other significant hilar, mediastinal or axillary adenopathy is seen. Pacer device projects over the left upper chest. Images of the upper abdomen are unremarkable. Adrenal glands appear normal. Bony structures are unremarkable. No significant abnormality.     No CT evidence of pulmonary embolism. Diffuse underlying centrilobular emphysema. 1.8 cm irregular nodular density in the lateral left upper lobe, consistent with neoplasm. Airspace density in the posterior right lower lobe as described with a small right pleural effusion. Possible infiltrate. Electronically signed by Summer Nelson MD    XR CHEST PORTABLE    Result Date: 3/6/2024  EXAM: XR CHEST PORTABLE. DATE: 3/6/2024 21:07 EST. INDICATION: cough, sob, history of lung cancer COMPARISON: 2/20/2024 TECHNIQUE: Standard per department protocol. FINDINGS: Medical devices: Left chest dual-lead pacemaker/ICD present. Borderline cardiomegaly. No focal consolidation, pleural fluid collection, or pneumothorax. Osseous structures unremarkable.     No acute radiographic abnormality of the chest. Electronically signed by John Salter MD      Personally reviewed Lab Studies, Imaging, and discussed case with ED physician.    Electronically signed by Cesar Aaron MD on 3/7/2024 at 5:27 AM    Comment: Please note this report has been produced using speech recognition software and may contain errors related to that system including errors in grammar, punctuation, and spelling, as well as words and phrases that may be inappropriate. If there are any questions or concerns please feel free to contact the dictating provider for clarification.

## 2024-03-07 NOTE — CARE COORDINATION
03/07/24 1627   Service Assessment   Patient Orientation Alert and Oriented   Cognition Alert   History Provided By Patient;Medical Record   Primary Caregiver Self   Support Systems Spouse/Significant Other   PCP Verified by CM Yes   Last Visit to PCP Within last 6 months   Prior Functional Level Independent in ADLs/IADLs   Current Functional Level Independent in ADLs/IADLs   Can patient return to prior living arrangement Yes   Ability to make needs known: Good   Family able to assist with home care needs: Yes   Would you like for me to discuss the discharge plan with any other family members/significant others, and if so, who? No   Financial Resources Medicare   Social/Functional History   Lives With Significant other   Type of Home House   Home Layout One level   Home Access Ramped entrance   ADL Assistance Independent   Discharge Planning   Type of Residence House   Living Arrangements Spouse/Significant Other   Current Services Prior To Admission None   DME Ordered? No   Potential Assistance Purchasing Medications No   Type of Home Care Services None   Patient expects to be discharged to: House   One/Two Story Residence One story     Pt has insurance and a pcp. Pt from home with S.O. Pt is independent with ADLs & trach care. Plan home no needs.

## 2024-03-07 NOTE — ED NOTES
RT suctioned thick, tan colored secretions via patient stoma.  RT obtained Henrique's trap specimen during suction and sent to lab. Dr Fierro aware.  Patent WOB currently improved and 02 titrated to 35% Fi02 via Venturi to trach mask with SP02 94-96%

## 2024-03-08 ENCOUNTER — HOSPITAL ENCOUNTER (OUTPATIENT)
Dept: RADIATION ONCOLOGY | Age: 69
End: 2024-03-08
Payer: COMMERCIAL

## 2024-03-08 LAB
ALBUMIN SERPL-MCNC: 4 GM/DL (ref 3.4–5)
ALP BLD-CCNC: 92 IU/L (ref 40–128)
ALT SERPL-CCNC: 9 U/L (ref 10–40)
ANION GAP SERPL CALCULATED.3IONS-SCNC: 11 MMOL/L (ref 7–16)
AST SERPL-CCNC: 11 IU/L (ref 15–37)
BASOPHILS ABSOLUTE: 0 K/CU MM
BASOPHILS RELATIVE PERCENT: 0.1 % (ref 0–1)
BILIRUB SERPL-MCNC: 0.4 MG/DL (ref 0–1)
BUN SERPL-MCNC: 12 MG/DL (ref 6–23)
CALCIUM SERPL-MCNC: 8.7 MG/DL (ref 8.3–10.6)
CHLORIDE BLD-SCNC: 103 MMOL/L (ref 99–110)
CO2: 26 MMOL/L (ref 21–32)
CREAT SERPL-MCNC: 0.8 MG/DL (ref 0.9–1.3)
CRP SERPL HS-MCNC: 10.2 MG/L
DIFFERENTIAL TYPE: ABNORMAL
EOSINOPHILS ABSOLUTE: 0 K/CU MM
EOSINOPHILS RELATIVE PERCENT: 0 % (ref 0–3)
GFR SERPL CREATININE-BSD FRML MDRD: >60 ML/MIN/1.73M2
GLUCOSE SERPL-MCNC: 134 MG/DL (ref 70–99)
HCT VFR BLD CALC: 37.9 % (ref 42–52)
HEMOGLOBIN: 12 GM/DL (ref 13.5–18)
IMMATURE NEUTROPHIL %: 0.5 % (ref 0–0.43)
L PNEUMO AG UR QL IA: NEGATIVE
LYMPHOCYTES ABSOLUTE: 0.8 K/CU MM
LYMPHOCYTES RELATIVE PERCENT: 5.6 % (ref 24–44)
MAGNESIUM: 2.2 MG/DL (ref 1.8–2.4)
MCH RBC QN AUTO: 30.3 PG (ref 27–31)
MCHC RBC AUTO-ENTMCNC: 31.7 % (ref 32–36)
MCV RBC AUTO: 95.7 FL (ref 78–100)
MONOCYTES ABSOLUTE: 0.5 K/CU MM
MONOCYTES RELATIVE PERCENT: 3.4 % (ref 0–4)
MRSA, DNA, NASAL: NEGATIVE
NUCLEATED RBC %: 0 %
PDW BLD-RTO: 14 % (ref 11.7–14.9)
PLATELET # BLD: 244 K/CU MM (ref 140–440)
PMV BLD AUTO: 9 FL (ref 7.5–11.1)
POTASSIUM SERPL-SCNC: 4.5 MMOL/L (ref 3.5–5.1)
PRO-BNP: 382.1 PG/ML
PROCALCITONIN SERPL-MCNC: 0.04 NG/ML
RBC # BLD: 3.96 M/CU MM (ref 4.6–6.2)
S PNEUM AG CSF QL: NORMAL
SEGMENTED NEUTROPHILS ABSOLUTE COUNT: 13.5 K/CU MM
SEGMENTED NEUTROPHILS RELATIVE PERCENT: 90.4 % (ref 36–66)
SPECIMEN DESCRIPTION: NORMAL
TOTAL IMMATURE NEUTOROPHIL: 0.07 K/CU MM
TOTAL NUCLEATED RBC: 0 K/CU MM
TOTAL PROTEIN: 6.1 GM/DL (ref 6.4–8.2)
WBC # BLD: 14.9 K/CU MM (ref 4–10.5)

## 2024-03-08 PROCEDURE — 2580000003 HC RX 258: Performed by: INTERNAL MEDICINE

## 2024-03-08 PROCEDURE — 2060000000 HC ICU INTERMEDIATE R&B

## 2024-03-08 PROCEDURE — 6360000002 HC RX W HCPCS: Performed by: INTERNAL MEDICINE

## 2024-03-08 PROCEDURE — 6370000000 HC RX 637 (ALT 250 FOR IP): Performed by: INTERNAL MEDICINE

## 2024-03-08 PROCEDURE — 83880 ASSAY OF NATRIURETIC PEPTIDE: CPT

## 2024-03-08 PROCEDURE — 84145 PROCALCITONIN (PCT): CPT

## 2024-03-08 PROCEDURE — 2700000000 HC OXYGEN THERAPY PER DAY

## 2024-03-08 PROCEDURE — 87081 CULTURE SCREEN ONLY: CPT

## 2024-03-08 PROCEDURE — 87641 MR-STAPH DNA AMP PROBE: CPT

## 2024-03-08 PROCEDURE — 99223 1ST HOSP IP/OBS HIGH 75: CPT | Performed by: INTERNAL MEDICINE

## 2024-03-08 PROCEDURE — 80053 COMPREHEN METABOLIC PANEL: CPT

## 2024-03-08 PROCEDURE — 94664 DEMO&/EVAL PT USE INHALER: CPT

## 2024-03-08 PROCEDURE — 6370000000 HC RX 637 (ALT 250 FOR IP): Performed by: STUDENT IN AN ORGANIZED HEALTH CARE EDUCATION/TRAINING PROGRAM

## 2024-03-08 PROCEDURE — 2580000003 HC RX 258: Performed by: STUDENT IN AN ORGANIZED HEALTH CARE EDUCATION/TRAINING PROGRAM

## 2024-03-08 PROCEDURE — 94640 AIRWAY INHALATION TREATMENT: CPT

## 2024-03-08 PROCEDURE — 6360000002 HC RX W HCPCS: Performed by: STUDENT IN AN ORGANIZED HEALTH CARE EDUCATION/TRAINING PROGRAM

## 2024-03-08 PROCEDURE — 87899 AGENT NOS ASSAY W/OPTIC: CPT

## 2024-03-08 PROCEDURE — APPNB60 APP NON BILLABLE TIME 46-60 MINS: Performed by: PHYSICIAN ASSISTANT

## 2024-03-08 PROCEDURE — 85025 COMPLETE CBC W/AUTO DIFF WBC: CPT

## 2024-03-08 PROCEDURE — 94761 N-INVAS EAR/PLS OXIMETRY MLT: CPT

## 2024-03-08 PROCEDURE — 36415 COLL VENOUS BLD VENIPUNCTURE: CPT

## 2024-03-08 PROCEDURE — 83735 ASSAY OF MAGNESIUM: CPT

## 2024-03-08 PROCEDURE — 86140 C-REACTIVE PROTEIN: CPT

## 2024-03-08 PROCEDURE — 87449 NOS EACH ORGANISM AG IA: CPT

## 2024-03-08 PROCEDURE — 84100 ASSAY OF PHOSPHORUS: CPT

## 2024-03-08 RX ADMIN — GUAIFENESIN 600 MG: 600 TABLET, EXTENDED RELEASE ORAL at 10:13

## 2024-03-08 RX ADMIN — SPIRONOLACTONE 25 MG: 50 TABLET ORAL at 10:13

## 2024-03-08 RX ADMIN — ENOXAPARIN SODIUM 40 MG: 100 INJECTION SUBCUTANEOUS at 10:12

## 2024-03-08 RX ADMIN — SODIUM CHLORIDE, PRESERVATIVE FREE 10 ML: 5 INJECTION INTRAVENOUS at 10:13

## 2024-03-08 RX ADMIN — SODIUM CHLORIDE 100 ML: 9 INJECTION, SOLUTION INTRAVENOUS at 21:56

## 2024-03-08 RX ADMIN — IPRATROPIUM BROMIDE AND ALBUTEROL SULFATE 1 DOSE: 2.5; .5 SOLUTION RESPIRATORY (INHALATION) at 20:26

## 2024-03-08 RX ADMIN — IPRATROPIUM BROMIDE AND ALBUTEROL SULFATE 1 DOSE: 2.5; .5 SOLUTION RESPIRATORY (INHALATION) at 15:13

## 2024-03-08 RX ADMIN — WATER 40 MG: 1 INJECTION INTRAMUSCULAR; INTRAVENOUS; SUBCUTANEOUS at 10:13

## 2024-03-08 RX ADMIN — CEFTRIAXONE 1000 MG: 1 INJECTION, POWDER, FOR SOLUTION INTRAMUSCULAR; INTRAVENOUS at 21:56

## 2024-03-08 RX ADMIN — SODIUM CHLORIDE, PRESERVATIVE FREE 10 ML: 5 INJECTION INTRAVENOUS at 21:50

## 2024-03-08 RX ADMIN — IPRATROPIUM BROMIDE AND ALBUTEROL SULFATE 1 DOSE: 2.5; .5 SOLUTION RESPIRATORY (INHALATION) at 11:07

## 2024-03-08 RX ADMIN — CARVEDILOL 6.25 MG: 6.25 TABLET, FILM COATED ORAL at 10:30

## 2024-03-08 RX ADMIN — PANTOPRAZOLE SODIUM 20 MG: 20 TABLET, DELAYED RELEASE ORAL at 10:30

## 2024-03-08 RX ADMIN — CARVEDILOL 6.25 MG: 6.25 TABLET, FILM COATED ORAL at 17:52

## 2024-03-08 RX ADMIN — CLOPIDOGREL BISULFATE 75 MG: 75 TABLET ORAL at 10:14

## 2024-03-08 RX ADMIN — GUAIFENESIN 600 MG: 600 TABLET, EXTENDED RELEASE ORAL at 21:49

## 2024-03-08 RX ADMIN — GABAPENTIN 800 MG: 400 CAPSULE ORAL at 21:49

## 2024-03-08 RX ADMIN — AZITHROMYCIN MONOHYDRATE 500 MG: 500 INJECTION, POWDER, LYOPHILIZED, FOR SOLUTION INTRAVENOUS at 21:57

## 2024-03-08 RX ADMIN — WATER 40 MG: 1 INJECTION INTRAMUSCULAR; INTRAVENOUS; SUBCUTANEOUS at 21:49

## 2024-03-08 RX ADMIN — ATORVASTATIN CALCIUM 80 MG: 40 TABLET, FILM COATED ORAL at 10:13

## 2024-03-08 RX ADMIN — IPRATROPIUM BROMIDE AND ALBUTEROL SULFATE 1 DOSE: 2.5; .5 SOLUTION RESPIRATORY (INHALATION) at 07:25

## 2024-03-08 RX ADMIN — LISINOPRIL 5 MG: 5 TABLET ORAL at 10:13

## 2024-03-08 RX ADMIN — ASPIRIN 81 MG: 81 TABLET, CHEWABLE ORAL at 10:13

## 2024-03-08 RX ADMIN — GABAPENTIN 800 MG: 400 CAPSULE ORAL at 10:14

## 2024-03-08 RX ADMIN — WATER 40 MG: 1 INJECTION INTRAMUSCULAR; INTRAVENOUS; SUBCUTANEOUS at 04:20

## 2024-03-08 RX ADMIN — LEVOTHYROXINE SODIUM 137 MCG: 25 TABLET ORAL at 10:30

## 2024-03-08 ASSESSMENT — PAIN SCALES - GENERAL: PAINLEVEL_OUTOF10: 0

## 2024-03-08 NOTE — CARE COORDINATION
CM reviewed chart and discussed in IDR. Pt is not medically ready at this time. Possible discharge today vs PD for tomorrow. Plan home.

## 2024-03-08 NOTE — CONSULTS
14.9 (H) 03/08/2024    HGB 12.0 (L) 03/08/2024    HCT 37.9 (L) 03/08/2024    MCV 95.7 03/08/2024     03/08/2024    LYMPHOPCT 5.6 (L) 03/08/2024    RBC 3.96 (L) 03/08/2024    MCH 30.3 03/08/2024    MCHC 31.7 (L) 03/08/2024    RDW 14.0 03/08/2024           Lab Results   Component Value Date    INR 1.0 03/06/2024    PROTIME 13.4 03/06/2024     Lab Results   Component Value Date     03/08/2024    K 4.5 03/08/2024     03/08/2024    CO2 26 03/08/2024    BUN 12 03/08/2024    CREATININE 0.8 (L) 03/08/2024    GLUCOSE 134 (H) 03/08/2024    CALCIUM 8.7 03/08/2024    PHOS 2.7 03/08/2024    MG 2.2 03/08/2024    PROT 6.1 (L) 03/08/2024    LABALBU 4.0 03/08/2024    BILITOT 0.4 03/08/2024    ALKPHOS 92 03/08/2024    AST 11 (L) 03/08/2024    ALT 9 (L) 03/08/2024    LABGLOM >60 03/08/2024     Lab Results   Component Value Date    ALKPHOS 92 03/08/2024    ALT 9 (L) 03/08/2024    AST 11 (L) 03/08/2024    BILITOT 0.4 03/08/2024    PROT 6.1 (L) 03/08/2024     No results found for: \"URICACID\"  No results found for: \"LDH\"  No results found for: \"IRON\", \"TIBC\", \"FERRITIN\"    Imaging:  US ABDOMEN LIMITED Specify organ? LIVER, GALLBLADDER   Final Result         1. Cholelithiasis without sonographic evidence of acute cholecystitis.   2. Mild diffuse hepatic steatosis.      Electronically signed by Bj Pringle MD        Assessment/Plan:  #Squamous Cell Carcinoma Left Upper Lung  Planning for definitive SBRT with Dr Katz. Radiation Oncology aware of admission, since treatment has not started yet will wait for discharge and reschedule. He is tentatively planning to start SBRT on 3/11/24.     #Acute on Chronic Respiratory Failure  Being treated for pneumonia and COPD. Enouraged pulmonary toileting and humidified air use at home as well as adequate hydration to thin secretions.  Appreciate pulmonology following.    #History of Laryngeal Cancer  Continue to f/w ENT    I have independently evaluated and examined this patient  today.  I have reviewed radiologic and biochemical tests on this patient. Management Plan is developed mutually with Keeley Lino PA-C. I have reviewed above note and agree with assessment and plan    Thank you for allowing me to participate in the care of this very pleasant patient.  Labs, rationale, and plan of care all discussed in depth with patient and questions and concerns addressed.

## 2024-03-09 VITALS
HEART RATE: 78 BPM | WEIGHT: 217 LBS | SYSTOLIC BLOOD PRESSURE: 103 MMHG | RESPIRATION RATE: 19 BRPM | OXYGEN SATURATION: 94 % | DIASTOLIC BLOOD PRESSURE: 54 MMHG | HEIGHT: 69 IN | BODY MASS INDEX: 32.14 KG/M2 | TEMPERATURE: 97.9 F

## 2024-03-09 PROBLEM — J96.01 ACUTE RESPIRATORY FAILURE WITH HYPOXIA (HCC): Status: RESOLVED | Noted: 2024-03-07 | Resolved: 2024-03-09

## 2024-03-09 PROBLEM — C34.12 MALIGNANT NEOPLASM OF UPPER LOBE OF LEFT LUNG (HCC): Status: ACTIVE | Noted: 2024-03-09

## 2024-03-09 LAB
ANION GAP SERPL CALCULATED.3IONS-SCNC: 7 MMOL/L (ref 7–16)
BASOPHILS ABSOLUTE: 0 K/CU MM
BASOPHILS RELATIVE PERCENT: 0.2 % (ref 0–1)
BUN SERPL-MCNC: 20 MG/DL (ref 6–23)
CALCIUM SERPL-MCNC: 9 MG/DL (ref 8.3–10.6)
CHLORIDE BLD-SCNC: 105 MMOL/L (ref 99–110)
CO2: 28 MMOL/L (ref 21–32)
CREAT SERPL-MCNC: 0.8 MG/DL (ref 0.9–1.3)
DIFFERENTIAL TYPE: ABNORMAL
EOSINOPHILS ABSOLUTE: 0 K/CU MM
EOSINOPHILS RELATIVE PERCENT: 0.1 % (ref 0–3)
GFR SERPL CREATININE-BSD FRML MDRD: >60 ML/MIN/1.73M2
GLUCOSE SERPL-MCNC: 141 MG/DL (ref 70–99)
HCT VFR BLD CALC: 39.7 % (ref 42–52)
HEMOGLOBIN: 12.8 GM/DL (ref 13.5–18)
IMMATURE NEUTROPHIL %: 0.5 % (ref 0–0.43)
LYMPHOCYTES ABSOLUTE: 0.8 K/CU MM
LYMPHOCYTES RELATIVE PERCENT: 5.4 % (ref 24–44)
MCH RBC QN AUTO: 30.8 PG (ref 27–31)
MCHC RBC AUTO-ENTMCNC: 32.2 % (ref 32–36)
MCV RBC AUTO: 95.7 FL (ref 78–100)
MONOCYTES ABSOLUTE: 0.3 K/CU MM
MONOCYTES RELATIVE PERCENT: 2.1 % (ref 0–4)
NUCLEATED RBC %: 0 %
PDW BLD-RTO: 14.6 % (ref 11.7–14.9)
PLATELET # BLD: 218 K/CU MM (ref 140–440)
PMV BLD AUTO: 8.6 FL (ref 7.5–11.1)
POTASSIUM SERPL-SCNC: 4.7 MMOL/L (ref 3.5–5.1)
RBC # BLD: 4.15 M/CU MM (ref 4.6–6.2)
SEGMENTED NEUTROPHILS ABSOLUTE COUNT: 14 K/CU MM
SEGMENTED NEUTROPHILS RELATIVE PERCENT: 91.7 % (ref 36–66)
SODIUM BLD-SCNC: 140 MMOL/L (ref 135–145)
TOTAL IMMATURE NEUTOROPHIL: 0.08 K/CU MM
TOTAL NUCLEATED RBC: 0 K/CU MM
WBC # BLD: 15.3 K/CU MM (ref 4–10.5)

## 2024-03-09 PROCEDURE — 6370000000 HC RX 637 (ALT 250 FOR IP): Performed by: INTERNAL MEDICINE

## 2024-03-09 PROCEDURE — 6370000000 HC RX 637 (ALT 250 FOR IP): Performed by: STUDENT IN AN ORGANIZED HEALTH CARE EDUCATION/TRAINING PROGRAM

## 2024-03-09 PROCEDURE — 2580000003 HC RX 258: Performed by: INTERNAL MEDICINE

## 2024-03-09 PROCEDURE — 94640 AIRWAY INHALATION TREATMENT: CPT

## 2024-03-09 PROCEDURE — 99231 SBSQ HOSP IP/OBS SF/LOW 25: CPT | Performed by: INTERNAL MEDICINE

## 2024-03-09 PROCEDURE — 80048 BASIC METABOLIC PNL TOTAL CA: CPT

## 2024-03-09 PROCEDURE — 94761 N-INVAS EAR/PLS OXIMETRY MLT: CPT

## 2024-03-09 PROCEDURE — 86140 C-REACTIVE PROTEIN: CPT

## 2024-03-09 PROCEDURE — 36415 COLL VENOUS BLD VENIPUNCTURE: CPT

## 2024-03-09 PROCEDURE — 85025 COMPLETE CBC W/AUTO DIFF WBC: CPT

## 2024-03-09 PROCEDURE — 6360000002 HC RX W HCPCS: Performed by: INTERNAL MEDICINE

## 2024-03-09 RX ORDER — GUAIFENESIN 600 MG/1
600 TABLET, EXTENDED RELEASE ORAL 2 TIMES DAILY
Qty: 14 TABLET | Refills: 0 | Status: SHIPPED | OUTPATIENT
Start: 2024-03-09 | End: 2024-03-16

## 2024-03-09 RX ORDER — PREDNISONE 50 MG/1
50 TABLET ORAL DAILY
Qty: 5 TABLET | Refills: 0 | Status: SHIPPED | OUTPATIENT
Start: 2024-03-09 | End: 2024-03-14

## 2024-03-09 RX ORDER — AZITHROMYCIN 500 MG/1
500 TABLET, FILM COATED ORAL DAILY
Qty: 1 TABLET | Refills: 0 | Status: SHIPPED | OUTPATIENT
Start: 2024-03-09 | End: 2024-03-09

## 2024-03-09 RX ORDER — AZITHROMYCIN 500 MG/1
500 TABLET, FILM COATED ORAL DAILY
Qty: 1 TABLET | Refills: 0 | Status: SHIPPED | OUTPATIENT
Start: 2024-03-09 | End: 2024-03-10

## 2024-03-09 RX ORDER — CEFDINIR 300 MG/1
300 CAPSULE ORAL 2 TIMES DAILY
Qty: 6 CAPSULE | Refills: 0 | Status: SHIPPED | OUTPATIENT
Start: 2024-03-09 | End: 2024-03-09

## 2024-03-09 RX ORDER — PREDNISONE 50 MG/1
50 TABLET ORAL DAILY
Qty: 5 TABLET | Refills: 0 | Status: SHIPPED | OUTPATIENT
Start: 2024-03-09 | End: 2024-03-09

## 2024-03-09 RX ORDER — CEFDINIR 300 MG/1
300 CAPSULE ORAL 2 TIMES DAILY
Qty: 6 CAPSULE | Refills: 0 | Status: SHIPPED | OUTPATIENT
Start: 2024-03-09 | End: 2024-03-12

## 2024-03-09 RX ORDER — GUAIFENESIN 600 MG/1
600 TABLET, EXTENDED RELEASE ORAL 2 TIMES DAILY
Qty: 14 TABLET | Refills: 0 | Status: SHIPPED | OUTPATIENT
Start: 2024-03-09 | End: 2024-03-09

## 2024-03-09 RX ADMIN — LEVOTHYROXINE SODIUM 137 MCG: 25 TABLET ORAL at 06:18

## 2024-03-09 RX ADMIN — GABAPENTIN 800 MG: 400 CAPSULE ORAL at 08:49

## 2024-03-09 RX ADMIN — PANTOPRAZOLE SODIUM 20 MG: 20 TABLET, DELAYED RELEASE ORAL at 06:19

## 2024-03-09 RX ADMIN — ENOXAPARIN SODIUM 40 MG: 100 INJECTION SUBCUTANEOUS at 08:49

## 2024-03-09 RX ADMIN — IPRATROPIUM BROMIDE AND ALBUTEROL SULFATE 1 DOSE: 2.5; .5 SOLUTION RESPIRATORY (INHALATION) at 07:47

## 2024-03-09 RX ADMIN — SODIUM CHLORIDE, PRESERVATIVE FREE 10 ML: 5 INJECTION INTRAVENOUS at 08:49

## 2024-03-09 RX ADMIN — GUAIFENESIN 600 MG: 600 TABLET, EXTENDED RELEASE ORAL at 08:48

## 2024-03-09 RX ADMIN — SPIRONOLACTONE 25 MG: 50 TABLET ORAL at 08:48

## 2024-03-09 RX ADMIN — ASPIRIN 81 MG: 81 TABLET, CHEWABLE ORAL at 08:48

## 2024-03-09 RX ADMIN — IPRATROPIUM BROMIDE AND ALBUTEROL SULFATE 1 DOSE: 2.5; .5 SOLUTION RESPIRATORY (INHALATION) at 11:28

## 2024-03-09 RX ADMIN — PREDNISONE 50 MG: 20 TABLET ORAL at 12:56

## 2024-03-09 RX ADMIN — CLOPIDOGREL BISULFATE 75 MG: 75 TABLET ORAL at 08:48

## 2024-03-09 RX ADMIN — ATORVASTATIN CALCIUM 80 MG: 40 TABLET, FILM COATED ORAL at 08:48

## 2024-03-09 NOTE — PLAN OF CARE
Problem: Discharge Planning  Goal: Discharge to home or other facility with appropriate resources  3/9/2024 1147 by Isabella Smith RN  Outcome: Progressing  Flowsheets (Taken 3/9/2024 0800)  Discharge to home or other facility with appropriate resources:   Identify barriers to discharge with patient and caregiver   Arrange for needed discharge resources and transportation as appropriate   Identify discharge learning needs (meds, wound care, etc)   Refer to discharge planning if patient needs post-hospital services based on physician order or complex needs related to functional status, cognitive ability or social support system  3/8/2024 2157 by Eloy Leung RN  Outcome: Progressing     Problem: Pain  Goal: Verbalizes/displays adequate comfort level or baseline comfort level  3/9/2024 1147 by Isabella Smith RN  Outcome: Progressing  Flowsheets (Taken 3/9/2024 0844)  Verbalizes/displays adequate comfort level or baseline comfort level:   Encourage patient to monitor pain and request assistance   Assess pain using appropriate pain scale   Administer analgesics based on type and severity of pain and evaluate response   Implement non-pharmacological measures as appropriate and evaluate response   Consider cultural and social influences on pain and pain management   Notify Licensed Independent Practitioner if interventions unsuccessful or patient reports new pain  3/8/2024 2157 by Eloy Leung RN  Outcome: Progressing     Problem: Safety - Adult  Goal: Free from fall injury  3/9/2024 1147 by Isabella Smith RN  Outcome: Progressing  3/8/2024 2157 by Eloy Leung RN  Outcome: Progressing     Problem: Chronic Conditions and Co-morbidities  Goal: Patient's chronic conditions and co-morbidity symptoms are monitored and maintained or improved  3/9/2024 1147 by Isabella Smith RN  Outcome: Progressing  Flowsheets (Taken 3/9/2024 0800)  Care Plan - Patient's Chronic Conditions and Co-Morbidity Symptoms are  Monitored and Maintained or Improved:   Monitor and assess patient's chronic conditions and comorbid symptoms for stability, deterioration, or improvement   Collaborate with multidisciplinary team to address chronic and comorbid conditions and prevent exacerbation or deterioration   Update acute care plan with appropriate goals if chronic or comorbid symptoms are exacerbated and prevent overall improvement and discharge  3/8/2024 2157 by Eloy Leung RN  Outcome: Progressing     Problem: Respiratory - Adult  Goal: Achieves optimal ventilation and oxygenation  3/9/2024 1147 by Isabella Smith RN  Outcome: Progressing  Flowsheets (Taken 3/9/2024 0800)  Achieves optimal ventilation and oxygenation:   Assess for changes in respiratory status   Assess for changes in mentation and behavior   Position to facilitate oxygenation and minimize respiratory effort   Oxygen supplementation based on oxygen saturation or arterial blood gases   Initiate smoking cessation protocol as indicated   Encourage broncho-pulmonary hygiene including cough, deep breathe, incentive spirometry   Assess the need for suctioning and aspirate as needed   Assess and instruct to report shortness of breath or any respiratory difficulty   Respiratory therapy support as indicated  3/8/2024 2157 by Eloy Leung RN  Outcome: Progressing

## 2024-03-09 NOTE — PLAN OF CARE
Problem: Discharge Planning  Goal: Discharge to home or other facility with appropriate resources  3/9/2024 1529 by Isabella Smith RN  Outcome: Completed  3/9/2024 1147 by Isabella Smith RN  Outcome: Progressing  Flowsheets (Taken 3/9/2024 0800)  Discharge to home or other facility with appropriate resources:   Identify barriers to discharge with patient and caregiver   Arrange for needed discharge resources and transportation as appropriate   Identify discharge learning needs (meds, wound care, etc)   Refer to discharge planning if patient needs post-hospital services based on physician order or complex needs related to functional status, cognitive ability or social support system     Problem: Pain  Goal: Verbalizes/displays adequate comfort level or baseline comfort level  3/9/2024 1529 by Isabella Smith RN  Outcome: Completed  3/9/2024 1147 by Isabella Smith RN  Outcome: Progressing  Flowsheets (Taken 3/9/2024 0844)  Verbalizes/displays adequate comfort level or baseline comfort level:   Encourage patient to monitor pain and request assistance   Assess pain using appropriate pain scale   Administer analgesics based on type and severity of pain and evaluate response   Implement non-pharmacological measures as appropriate and evaluate response   Consider cultural and social influences on pain and pain management   Notify Licensed Independent Practitioner if interventions unsuccessful or patient reports new pain     Problem: Safety - Adult  Goal: Free from fall injury  3/9/2024 1529 by Isabella Smith RN  Outcome: Completed  3/9/2024 1147 by Isabella Smith RN  Outcome: Progressing     Problem: Chronic Conditions and Co-morbidities  Goal: Patient's chronic conditions and co-morbidity symptoms are monitored and maintained or improved  3/9/2024 1529 by Isabella Smith RN  Outcome: Completed  3/9/2024 1147 by Isabella Smith RN  Outcome: Progressing  Flowsheets (Taken 3/9/2024 0800)  Care

## 2024-03-09 NOTE — DISCHARGE SUMMARY
0.9 0.8* 0.8*   GLUCOSE 113* 134* 141*     Hepatic:   Recent Labs     03/06/24 2105 03/08/24  0353   AST 14* 11*   ALT 12 9*   BILITOT 0.5 0.4   ALKPHOS 123 92     Lipids:   Lab Results   Component Value Date/Time    CHOL 136 01/08/2024 07:40 AM    HDL 33 01/08/2024 07:40 AM    TRIG 121 01/08/2024 07:40 AM     Hemoglobin A1C:   Lab Results   Component Value Date/Time    LABA1C 5.5 01/11/2024 12:04 PM     TSH: No results found for: \"TSH\"  Troponin: No results found for: \"TROPONINT\"  Lactic Acid: No results for input(s): \"LACTA\" in the last 72 hours.  BNP:   Recent Labs     03/06/24 2105 03/08/24  0353   PROBNP 185.4 382.1*     UA:  Lab Results   Component Value Date/Time    NITRU Negative 01/11/2024 11:54 AM    COLORU Yellow 01/11/2024 11:54 AM    PHUR 7.5 01/11/2024 11:54 AM    CLARITYU Clear 01/11/2024 11:54 AM    SPECGRAV 1.005 01/11/2024 11:54 AM    LEUKOCYTESUR Negative 01/11/2024 11:54 AM    UROBILINOGEN 0.2 01/11/2024 11:54 AM    BILIRUBINUR Negative 01/11/2024 11:54 AM    BLOODU Negative 01/11/2024 11:54 AM    GLUCOSEU Negative 01/11/2024 11:54 AM    KETUA Negative 01/11/2024 11:54 AM     Urine Cultures: No results found for: \"LABURIN\"  Blood Cultures: No results found for: \"BC\"  No results found for: \"BLOODCULT2\"  Organism: No results found for: \"ORG\"    Time Spent Discharging patient 55 minutes    Electronically signed by Carie Camarena MD on 3/9/2024 at 11:34 AM

## 2024-03-09 NOTE — PLAN OF CARE
Problem: Discharge Planning  Goal: Discharge to home or other facility with appropriate resources  3/8/2024 2157 by Eloy Leung RN  Outcome: Progressing  3/8/2024 1148 by Vee Zazueta RN  Outcome: Progressing     Problem: Pain  Goal: Verbalizes/displays adequate comfort level or baseline comfort level  3/8/2024 2157 by Eloy Leung RN  Outcome: Progressing  3/8/2024 1148 by Vee Zazueta RN  Outcome: Progressing     Problem: Safety - Adult  Goal: Free from fall injury  3/8/2024 2157 by Eloy Leung RN  Outcome: Progressing  3/8/2024 1148 by Vee Zazueta RN  Outcome: Progressing     Problem: Chronic Conditions and Co-morbidities  Goal: Patient's chronic conditions and co-morbidity symptoms are monitored and maintained or improved  3/8/2024 2157 by Eloy Leung RN  Outcome: Progressing  3/8/2024 1148 by Vee Zazueta RN  Outcome: Progressing     Problem: Respiratory - Adult  Goal: Achieves optimal ventilation and oxygenation  3/8/2024 2157 by Eloy Leung RN  Outcome: Progressing  3/8/2024 1148 by Vee Zazueta RN  Outcome: Progressing

## 2024-03-10 LAB
CULTURE: ABNORMAL
CULTURE: ABNORMAL
CULTURE: NORMAL
CULTURE: NORMAL
Lab: ABNORMAL
Lab: NORMAL
Lab: NORMAL
SPECIMEN: ABNORMAL
SPECIMEN: NORMAL
SPECIMEN: NORMAL

## 2024-03-10 NOTE — PROGRESS NOTES
V2.0    [facility] [place] Note      Name:  Brayan Galeana /Age/Sex: 1955  (68 y.o. male)   MRN & CSN:  [id number] & [id number] Encounter Date/Time: 3/8/2024 7:35 AM EST   Location:  -A PCP: Beverly Marie APRN - NP     Attending:Carie Camarena*       Hospital Day: 2    Assessment and Recommendations   Brayan Galeana is a 68 y.o. male with pmh of chronic systolic CHF s/p ICD, history of laryngeal cancer s/p laryngectomy, radiation-in , has a stoma, hypertension, hypothyroidism, GERD, chronic pain, COPD, chronic respiratory failure on home oxygen  who presents with Acute respiratory failure with hypoxia (HCC)      Plan:   Acute on chronic hypoxic and hypercapnic respiratory failure  Pneumonia  COPD with acute exacerbation  Patient presented with right sided chest pain, non productive cough and ?orthopnea (sleeping in recliner). Recent admission for left PTX post lung biopsy and discharge on home O2   -- CTA chest - no PE, airspace density in the posterior right lower lobe with a small pleural effusion, possible infiltrate  -- Initial VBG showed pH 7.25, pCO2 77.1, pO2 57.9, HCO3 33.4, last VBG obtained showed improvement with pH 7.33, pCO2 53, pO2 88, HCO3 27.9  -- Follow up Bcx, resp sputum Cx, MRSA screen, and urinary anigens  -- Continue azithro and ceftriaxone  -- Patient is currently on trach mask at 4 L/min, wean to baseline of 3L  -- Respiratory viral panel negative  -- Pulmonology on board, appreciate recs     Lung nodule  -- Path report shows moderately differentiated squamous cell carcinoma  -- Patient following with Dr. Acevedo, consult placed     Chronic systolic CHF  Echo from 2021 shows EF 25-30%. Patient is s/p ICD  -- Continue aspirin, carvedilol, atorvastatin, lisinopril, and spironolactone     Coronary artery disease  -- Continue aspirin, Plavix, carvedilol, and atorvastatin     Hypertension  -- Continue lisinopril and carvedilol  -- Monitor BP trend and adjust regimen 
  Physician Progress Note      PATIENT:               JACQUELYN ARANDA  [facility] #:                  [id number]  :                       1955  ADMIT DATE:       3/7/2024 4:31 AM  DISCH DATE:  RESPONDING  PROVIDER #:        Carie Camarena MD          QUERY TEXT:    Pt admitted with Pneumonia. Pt noted to have WBC14.1-14.9, Lactic acid 0.7, RR   32. If possible, please document in the progress notes and discharge summary   if you are evaluating and /or treating any of the following:    The medical record reflects the following:  Risk Factors:  Pneumonia, Recent admission-2/ for left   pneumothorax s/p biopsy  Clinical Indicators: Respiratory fail,  CTA chest - no PE, airspace density in   the posterior right lower lobe with a small pleural effusion, possible   infiltrate, WBC14.1-14.9, Lactic acid 0.7, RR 32  Treatment: Rocephin IV Zithromax IV, Mucinex 600 mg twice a day.Solu-Medrol 40   q.8 hours, trach mask at 7 L/min, NS bolus of 500 cc.    Thank you, Joanne Hernandez RN, [facility] [phone]  Options provided:  -- Sepsis, present on admission  -- Pneumonia, without Sepsis  -- Other - I will add my own diagnosis  -- Disagree - Not applicable / Not valid  -- Disagree - Clinically unable to determine / Unknown  -- Refer to Clinical Documentation Reviewer    PROVIDER RESPONSE TEXT:    This patient has pneumonia, without Sepsis.    Query created by: Joanne Hernandez on 3/8/2024 2:30 PM      Electronically signed by:  Carie Camarena MD 3/9/2024 7:07 AM          
4 Eyes Skin Assessment     NAME:  Brayan Galeana  YOB: 1955  MEDICAL RECORD NUMBER:  [id number]    The patient is being assessed for  Admission    I agree that at least one RN has performed a thorough Head to Toe Skin Assessment on the patient. ALL assessment sites listed below have been assessed.      Areas assessed by both nurses:    Head, Face, Ears, Shoulders, Back, Chest, Arms, Elbows, Hands, Sacrum. Buttock, Coccyx, Ischium, Legs. Feet and Heels, and Under Medical Devices         Does the Patient have a Wound? No noted wound(s)       Benjamin Prevention initiated by RN: No  Wound Care Orders initiated by RN: No    Pressure Injury (Stage 3,4, Unstageable, DTI, NWPT, and Complex wounds) if present, place Wound referral order by RN under : No    New Ostomies, if present place, Ostomy referral order under : No     Nurse 1 eSignature: Electronically signed by Soledad Whittaker RN on 3/7/24 at 7:27 AM EST    **SHARE this note so that the co-signing nurse can place an eSignature**    Nurse 2 eSignature: Electronically signed by Tanner Mckenzie RN on 3/7/24 at 7:28 AM EST    
Hematology Oncology Inpatient Consult    Patient Name:  Brayan Galeana  Patient :  1955  Patient MRN:  [id number]     Primary Oncologist: Butch Acevedo MD  PCP: Beverly Marie, GOGO - NP     Date of Service: 3/6/2024      Reason for Consult: known to service     Chief Complaint:  No chief complaint on file.    HPI:   Brayan Galeana is a 68 y.o. 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 He was planned to start SBRT on 3/7/2024 however he was admitted to the hospital for acute on chronic respiratory failure. CTA Chest on admission was negative for PE, showed airspace density in posterior RLL with possible infiltrate and is being treated for CAP and COPD exacerbation. He initially required >5L oxygen and on exam today he has been weaned to 4L, 3L is baseline for him. He feels improved overall on exam with less SOB. He was previously unable to lay flat d/t SOB and dyspnea. Notes productive cough with thick yellow mucus.  Does not have humidified oxygen at home but does have humidifiers around his house. No fever/chills.  No CP.  No N/V.  No change in bowel or bladder habits.  No new pain.  No HA/Dizziness.    Lab data reviewed with WBC 14.9, Hgb 12.0 with MCV 95, MCHC 31, RDW 14, Plt 244k. Cr 0.8. LFT grossly normal outside of reduced total protein.    Interval  3/9/34  Pt was seen and examined. Not in any acute distress. No overnight events. He is feeling better.      Labs today showed Cr 0.8, Ca 9, WBC 15.3, Hb 12.8, Plt 218    Vital Signs: BP (!) 103/54   Pulse 78   Temp 97.9 °F (36.6 °C) (Oral)   Resp 19   Ht 1.753 m (5' 9\")   Wt 98.4 kg (217 lb)   SpO2 94%   BMI 32.05 kg/m²    
Patient refused bed alarm. Form signed and placed in soft chart.   
Pulmonary and Critical Care  Progress Note    Subjective:   The patient is better.  Shortness of breath has improved  Chest pain none.  Addressing respiratory complaints Patient is negative for  hemoptysis and cyanosis  CONSTITUTIONAL:  negative for fevers and chills      Past Medical History:     has a past medical history of CAD s/p MI 1997, Cerebrovascular disease, CHF (congestive heart failure) (HCC), COPD (chronic obstructive pulmonary disease) (HCC), Heart attack (HCC), Hyperlipidemia, Hypertension, Hypothyroidism, ICD (implantable cardioverter-defibrillator), dual, in situ, Neuropathy, Stroke (HCC), and Throat cancer ([facility]).   has a past surgical history that includes Appendectomy (1961); Ankle surgery; tracheostomy; pacemaker placement; Thumb arthroscopy; and CT NEEDLE BIOPSY LUNG PERCUTANEOUS (2/15/2024).   reports that he has quit smoking. His smoking use included cigarettes. He started smoking about 52 years ago. He has a 84.0 pack-year smoking history. He has been exposed to tobacco smoke. He has never used smokeless tobacco. He reports that he does not currently use alcohol. He reports that he does not currently use drugs.  Family history:  family history includes Alcohol Abuse in his father; Cancer in his brother, brother, brother, and father; Diabetes in his brother, mother, and sister; Heart Attack in his mother; Heart Disease in his father and mother; Lung Cancer in his father.    No Known Allergies  Social History:    Reviewed; no changes    Objective:   PHYSICAL EXAM:        VITALS:  BP (!) 113/53   Pulse 74   Temp 97.3 °F (36.3 °C) (Oral)   Resp 28   Ht 1.753 m (5' 9\")   Wt 98.4 kg (217 lb)   SpO2 99%   BMI 32.05 kg/m²     24HR INTAKE/OUTPUT:    Intake/Output Summary (Last 24 hours) at 3/8/2024 1121  Last data filed at 3/7/2024 1745  Gross per 24 hour   Intake 720 ml   Output --   Net 720 ml         CONSTITUTIONAL:  awake, alert, cooperative, no apparent distress, and appears stated 
Seen by my colleague earlier. Please refer to her note for detail H&P. Patient feeling better. Pulm following. Continue abx and follow cultures   
age  LUNGS:  decreased breath sounds.  CARDIOVASCULAR:  normal S1 and S2 and negative JVD  ABD:Abdomen soft, non-tender. BS normal. No masses,  No organomegaly  NEURO:Alert.  DATA:    CBC:  Recent Labs     03/06/24 2105 03/08/24 0353 03/09/24 0215   WBC 14.1* 14.9* 15.3*   RBC 4.53* 3.96* 4.15*   HGB 13.8 12.0* 12.8*   HCT 42.8 37.9* 39.7*    244 218   MCV 94.5 95.7 95.7   MCH 30.5 30.3 30.8   MCHC 32.2 31.7* 32.2   RDW 13.9 14.0 14.6   SEGSPCT 62.3 90.4* 91.7*        BMP:  Recent Labs     03/06/24 2105 03/08/24 0353 03/09/24 0215    140 140   K 4.3 4.5 4.7   CL 99 103 105   CO2 30 26 28   BUN 10 12 20   CREATININE 0.9 0.8* 0.8*   CALCIUM 7.7* 8.7 9.0   GLUCOSE 113* 134* 141*        ABG:  No results for input(s): \"PH\", \"PO2ART\", \"DBB6KCJ\", \"HCO3\", \"BEART\", \"O2SAT\" in the last 72 hours.  Lab Results   Component Value Date    PROBNP 382.1 (H) 03/08/2024    PROBNP 185.4 03/06/2024     No results found for: \"CULTRESP\"    Radiology Review:  Pertinent images / reports were reviewed as a part of this visit.    Assessment:     Patient Active Problem List   Diagnosis    Suicide attempt (HCC)    CHF (congestive heart failure) (HCC)    Lung nodule    Lesion of spleen    History of CVA in adulthood    History of laryngeal cancer    Personal history of MI (MI 1997)    Postoperative hypothyroidism    Mixed hyperlipidemia    History of laryngectomy    Primary hypertension    Pneumothorax    ICD (implantable cardioverter-defibrillator), dual, in situ    CAD s/p MI 1997    Malignant neoplasm of upper lobe of left lung (HCC)       Plan:   1. Overall the patient has improved.  2. Wean FiO2.  3. Inc. Activity.  Ruperto Johansen MD   3/9/2024  11:31 AM

## 2024-03-11 ENCOUNTER — HOSPITAL ENCOUNTER (OUTPATIENT)
Dept: RADIATION ONCOLOGY | Age: 69
Discharge: HOME | End: 2024-03-11
Payer: COMMERCIAL

## 2024-03-11 ENCOUNTER — APPOINTMENT (OUTPATIENT)
Dept: RADIATION ONCOLOGY | Age: 69
End: 2024-03-11
Payer: COMMERCIAL

## 2024-03-11 ENCOUNTER — TELEPHONE (OUTPATIENT)
Dept: FAMILY MEDICINE CLINIC | Age: 69
End: 2024-03-11

## 2024-03-11 PROCEDURE — 77373 STRTCTC BDY RAD THER TX DLVR: CPT | Performed by: RADIOLOGY

## 2024-03-11 PROCEDURE — NBSRV NON-BILLABLE SERVICE: Performed by: RADIOLOGY

## 2024-03-11 PROCEDURE — 77280 THER RAD SIMULAJ FIELD SMPL: CPT | Performed by: RADIOLOGY

## 2024-03-11 NOTE — TELEPHONE ENCOUNTER
Care Transitions Initial Follow Up Call    Outreach made within 2 business days of discharge: Yes    Patient: Brayan Galeana Patient : 1955   MRN: [id number]  Reason for Admission: There are no discharge diagnoses documented for the most recent discharge.  Discharge Date: 3/9/24       Spoke with: left a  for pt to call office to be seen for Hospital follow up    Discharge department/facility: [facility] Interactive Patient Contact:    Scheduled appointment with PCP within 7-14 days    Follow Up  Future Appointments   Date Time Provider Department Center   3/12/2024 11:15 AM José Katz MD [facility] [place]   3/12/2024 11:30 AM José Katz MD [facility] [place]   3/13/2024 11:15 AM José Katz MD [facility] [place]   3/14/2024 11:15 AM José Katz MD [facility] [place]   3/15/2024 11:15 AM José Katz MD [facility] [place]   3/18/2024  3:30 PM [facility]   3/20/2024  1:20 PM Brennon Conley MD [facility]   2024  2:00 PM Butch Acevedo MD [facility]   2024  2:15 PM HE[facility]EN, MED ONC NURSE [facility] [place]       Summer Puentes MA

## 2024-03-12 ENCOUNTER — HOSPITAL ENCOUNTER (OUTPATIENT)
Dept: RADIATION ONCOLOGY | Age: 69
Discharge: HOME | End: 2024-03-12
Payer: COMMERCIAL

## 2024-03-12 VITALS — WEIGHT: 217 LBS | BODY MASS INDEX: 32.05 KG/M2

## 2024-03-12 LAB
CULTURE: NORMAL
CULTURE: NORMAL
Lab: NORMAL
Lab: NORMAL
SPECIMEN: NORMAL
SPECIMEN: NORMAL

## 2024-03-12 PROCEDURE — 77373 STRTCTC BDY RAD THER TX DLVR: CPT | Performed by: RADIOLOGY

## 2024-03-12 ASSESSMENT — PAIN SCALES - GENERAL: PAINLEVEL_OUTOF10: 0

## 2024-03-12 NOTE — PROGRESS NOTES
Weekly Radiation Treatment Progress Note    DATE OF SERVICE: 3/12/2024     DIAGNOSIS:  SCC BALWINDER of lung [id number]     TREATMENT COURSE:       Site: BALWINDER tumor   Current Total Radiation Dose: 2000 cGy  Fraction: 2/5    Pt doing well. Energy fairly good.  No skin itching/soreness. Breathing stable. No dysphagia/odynophagia      EXAM  Wt Readings from Last 3 Encounters:   03/07/24 98.4 kg (217 lb)   03/06/24 98.4 kg (217 lb)   02/29/24 98.4 kg (217 lb)     NAD      Setup images, chart, plan reviewed    A/P:   Tolerating RT well  Continue RT as planned  Once finished RTC 4-6 weeks with CT chest beforehand.       Electronically signed by José Katz MD on 3/12/2024 at 10:40 AM

## 2024-03-13 ENCOUNTER — HOSPITAL ENCOUNTER (OUTPATIENT)
Dept: RADIATION ONCOLOGY | Age: 69
Discharge: HOME | End: 2024-03-13
Payer: COMMERCIAL

## 2024-03-13 PROCEDURE — 77373 STRTCTC BDY RAD THER TX DLVR: CPT | Performed by: RADIOLOGY

## 2024-03-14 ENCOUNTER — TELEPHONE (OUTPATIENT)
Dept: CARDIOLOGY CLINIC | Age: 69
End: 2024-03-14

## 2024-03-14 ENCOUNTER — HOSPITAL ENCOUNTER (OUTPATIENT)
Dept: RADIATION ONCOLOGY | Age: 69
Discharge: HOME | End: 2024-03-14
Payer: COMMERCIAL

## 2024-03-14 PROCEDURE — 77373 STRTCTC BDY RAD THER TX DLVR: CPT | Performed by: RADIOLOGY

## 2024-03-14 NOTE — TELEPHONE ENCOUNTER
Returned call to Catarino at [facility]. Dr. Katz was concerned about the process of patients device checks. I advised her that patient has a appointment in our office on 3/20/24 and at that time his ICD would be checked in the office. She said that she would relay that information to Dr. Katz. Advised her to call back if there are any more concerns or questions. She agreed.

## 2024-03-14 NOTE — TELEPHONE ENCOUNTER
Catarino monzon/ Dr [facility] called   Patient will finish last radiation treatment   3/15 they have questions about his   Pace maker check , they have been   Placing a magnet when doing his   Treatments .

## 2024-03-15 ENCOUNTER — HOSPITAL ENCOUNTER (OUTPATIENT)
Dept: RADIATION ONCOLOGY | Age: 69
Discharge: HOME | End: 2024-03-15
Payer: COMMERCIAL

## 2024-03-15 PROCEDURE — 77336 RADIATION PHYSICS CONSULT: CPT | Performed by: RADIOLOGY

## 2024-03-15 PROCEDURE — 77373 STRTCTC BDY RAD THER TX DLVR: CPT | Performed by: RADIOLOGY

## 2024-03-15 NOTE — PROGRESS NOTES
Pt completed radiation treatments today. This RN confirmed with [place] at [facility]/Dr. Sanon regarding pacemaker check following radiation treatments. Pt is scheduled for office chris=sit and pacer interrogation 3/20/2024, no further check required per Dr. Sanon. Pt aware of appointment and importance of keeping that appt, pt voices understanding. RT staff and Dr. Katz aware of above.

## 2024-03-15 NOTE — PROGRESS NOTES
Radiation Oncology  Treatment Completion Summary  Encounter Date: 3/15/2024 1:01 PM    Mr. Brayan Galeana is a 68 y.o. male  : 1955  MRN: [id number]  Acct Number: [id number]      FOLLOW UP PHYSICIANS:   Primary Care: Beverly Marie APRN - NP   Medical Oncologist: Dr. Acevedo  Cardiology: Dr. Conley    DIAGNOSIS: SCC BALWINDER of lung [id number]      TREATMENT COURSE:       HISTORY:  Brayan Galeana is a 68 y.o. male with the above referenced diagnosis.  Complete details on history of present illness please see my initial consultation note.  The patient has recently completed a course of SBRT radiation therapy and what follows is a description of the treatments received:    ANATOMIC SITE: BALWINDER   BEAM ORIENTATION: SBRT  ENERGY: 6MV photons  DOSE PER FRACTION: 1000cGy  TOTAL DOSE: 5000cGy; 5/5 fractions    ELAPSED DAYS: 3/11/24 - 3/15/24    TREATMENT TOLERANCE:   Overall, the patient tolerated radiation therapy quite well.  The patient's energy level was fairly well-maintained throughout the course of treatments.  No significant skin erythema developed.  Breathing was well-maintained throughout the course of treatments.  No significant dysphagia or odynophagia developed.      FOLLOW-UP PLANS:   The patient is to return to see me in 1 month with a chest CT prior or to return sooner as needed.      Electronically signed by José Katz MD on 3/15/2024 at 1:01 PM

## 2024-03-18 ENCOUNTER — APPOINTMENT (OUTPATIENT)
Dept: RADIATION ONCOLOGY | Age: 69
End: 2024-03-18
Payer: COMMERCIAL

## 2024-03-20 ENCOUNTER — OFFICE VISIT (OUTPATIENT)
Dept: CARDIOLOGY CLINIC | Age: 69
End: 2024-03-20
Payer: COMMERCIAL

## 2024-03-20 VITALS
SYSTOLIC BLOOD PRESSURE: 110 MMHG | BODY MASS INDEX: 31.73 KG/M2 | HEART RATE: 75 BPM | WEIGHT: 214.2 LBS | HEIGHT: 69 IN | DIASTOLIC BLOOD PRESSURE: 66 MMHG | OXYGEN SATURATION: 94 %

## 2024-03-20 DIAGNOSIS — I25.10 CORONARY ARTERY DISEASE INVOLVING NATIVE CORONARY ARTERY OF NATIVE HEART WITHOUT ANGINA PECTORIS: ICD-10-CM

## 2024-03-20 DIAGNOSIS — E78.2 MIXED HYPERLIPIDEMIA: ICD-10-CM

## 2024-03-20 DIAGNOSIS — Z95.810 ICD (IMPLANTABLE CARDIOVERTER-DEFIBRILLATOR), DUAL, IN SITU: Primary | ICD-10-CM

## 2024-03-20 DIAGNOSIS — I10 PRIMARY HYPERTENSION: ICD-10-CM

## 2024-03-20 DIAGNOSIS — I50.22 CHRONIC SYSTOLIC CONGESTIVE HEART FAILURE (HCC): ICD-10-CM

## 2024-03-20 PROCEDURE — 99214 OFFICE O/P EST MOD 30 MIN: CPT | Performed by: INTERNAL MEDICINE

## 2024-03-20 PROCEDURE — 3074F SYST BP LT 130 MM HG: CPT | Performed by: INTERNAL MEDICINE

## 2024-03-20 PROCEDURE — 1123F ACP DISCUSS/DSCN MKR DOCD: CPT | Performed by: INTERNAL MEDICINE

## 2024-03-20 PROCEDURE — 3078F DIAST BP <80 MM HG: CPT | Performed by: INTERNAL MEDICINE

## 2024-03-20 NOTE — ASSESSMENT & PLAN NOTE
Device is working well and there is no significant change in today's interrogation compared to last interrogation before he had radiation to left upper chest.  Continue monitoring as per [facility] schedule.  Remaining device longevity is 3.3 years.

## 2024-03-20 NOTE — PATIENT INSTRUCTIONS
Please be informed that if you contact our office outside of normal business hours the physician on call cannot help with any scheduling or rescheduling issues, procedure instruction questions or any type of medication issue.    We advise you for any urgent/emergency that you go to the nearest emergency room!    PLEASE CALL OUR OFFICE DURING NORMAL BUSINESS HOURS  Continue current cardiovascular medications which have been reviewed and discussed individually with you. Continue device check as per care link schedule.   Echocardiogram to evaluate LV function. Appropriate prescriptions if needed on this visit are addressed. After visit summery is provided.   Questions answered and patient verbalizes understanding. Follow up in 3 months,  sooner if needed.  Monday - Friday   8 am to 5 pm    [place]: [phone]    [place]: [phone]    [place]:  [phone]    **It is YOUR responsibilty to bring medication bottles and/or updated medication list to EACH APPOINTMENT. This will allow us to better serve you and all your healthcare needs**    Thank you for allowing us to care for you today!   We want to ensure we can follow your treatment plan and we strive to give you the best outcomes and experience possible.   If you ever have a life threatening emergency and call 911 - for an ambulance (EMS)   Our providers can only care for you at:   [facility] or [facility].   Even if you have someone take you or you drive yourself we can only care for you in a [facility] facility. Our providers are not setup at the other healthcare locations!

## 2024-03-20 NOTE — PROGRESS NOTES
Brayan Galeana  1955  Beverly Marie, APRN - NP      Chief Complaint   Patient presents with    1 Month Follow-Up     Pt denies cardiac symptoms     Chief complaint and HPI:  Brayan Galeana  is a 68 y.o. male following up for ICD interrogation because he had recent left upper lung radiation and they applied magnet for every session he had radiation about 5 times and wanted to make sure everything is okay.  Denies any cardiac symptoms.  He has history of systolic heart failure and coronary artery disease hypertension hyperlipidemia history of lung cancer and laryngeal cancer status post laryngectomy and tracheostomy.  Medications reviewed and reconciled.  He is accompanied by his wife.  Carvedilol was increased on his last visit and he is tolerating it well.    Rest of the Cardiovascular system review is otherwise unchanged from prior encounter.  Past medical history:  has a past medical history of CAD s/p MI 1997, Cerebrovascular disease, CHF (congestive heart failure) ([facility]), COPD (chronic obstructive pulmonary disease) ([facility]), Heart attack ([facility]), Hyperlipidemia, Hypertension, Hypothyroidism, ICD (implantable cardioverter-defibrillator), dual, in situ, Neuropathy, Stroke ([facility]), and Throat cancer ([facility]).  Past surgical history:  has a past surgical history that includes Appendectomy (1961); Ankle surgery; tracheostomy; pacemaker placement; Thumb arthroscopy; and CT NEEDLE BIOPSY LUNG PERCUTANEOUS (2/15/2024).  Social History:   Social History     Tobacco Use    Smoking status: Former     Average packs/day: 1.6 packs/day for 52.2 years (84.0 ttl pk-yrs)     Types: Cigarettes     Start date: 1972     Passive exposure: Past    Smokeless tobacco: Never   Substance Use Topics    Alcohol use: Not Currently     Family history: family history includes Alcohol Abuse in his father; Cancer in his brother, brother, brother, and father; Diabetes in his brother, mother, and sister; Heart Attack in his mother; Heart Disease in

## 2024-03-20 NOTE — ASSESSMENT & PLAN NOTE
Patient is clinically not in congestive heart failure.  He is on combination of carvedilol and lisinopril and Aldactone continue them all and we will repeat echo prior to next office visit.  Last LV ejection fraction reported in 2021 was 25 to 30%.  Has not had echo since then.

## 2024-04-01 RX ORDER — GABAPENTIN 800 MG/1
1600 TABLET ORAL DAILY
Qty: 60 TABLET | Refills: 1 | Status: SHIPPED | OUTPATIENT
Start: 2024-04-01 | End: 2024-05-01

## 2024-04-01 RX ORDER — GABAPENTIN 800 MG/1
1600 TABLET ORAL DAILY
Qty: 60 TABLET | Refills: 0 | OUTPATIENT
Start: 2024-04-01

## 2024-04-03 NOTE — TELEPHONE ENCOUNTER
Called 3x left messages no return calls  
Is podiatry filling?   He should have been out 3/1/24 if they are not filling it  
LMOVM for patient to contact the office   
Left message for patient to call the office back  
Left message to return call  
PCP is refilling brandon right now, but pt needs to see podiatry for his feet pain to see if there is anything they can do to help. Prev chemo is likely a contributing factor. Please remind him.     PDMP reviewed.   UDS and contract complete FTF with PCP on 1/11/24      Pt needs follow up scheduled with PCP April 2024.       
Pt called and stated he is not seeing Podiatry and does need the Gabapentin. Please advise   
clinical correlation is recommended, particularly when comparing to results   calculated using previous equations.  The CKD-EPI equation is less accurate in patients with extremes of muscle mass, extra-renal   metabolism of creatine, excessive creatine ingestion, or following therapy that affects   renal tubular secretion.         Lipids:    Cholesterol   Date Value Ref Range Status   01/08/2024 136 <200 MG/DL Final     Comment:     (NOTE)  Cardiovascular risk evaluation guidelines:  Low Risk        <200 mg/dL  Average Risk    200-240 mg/dL  High Risk       >240 mg/dL         Triglycerides   Date Value Ref Range Status   01/08/2024 121 <150 MG/DL Final     HDL   Date Value Ref Range Status   01/08/2024 33 (L) >40 MG/DL Final     LDL Calculated   Date Value Ref Range Status   01/08/2024 79 <100 MG/DL Final       A1C:    Hemoglobin A1C   Date Value Ref Range Status   01/11/2024 5.5 % Final       TSH:    TSH   Date Value Ref Range Status   01/11/2024 0.05 (L) 0.27 - 4.20 uIU/mL Final     TSH, High Sensitivity   Date Value Ref Range Status   01/08/2024 0.049 (L) 0.270 - 4.20 uIu/ml Final     Comment:             Patients with high levels of Biotin intake (ie >5mg/day) may have falsely decreased TSHS   levels.  Samples collected within 8 hours of Biotin intake may require additional information for   diagnosis.         UA:  Color, UA   Date Value Ref Range Status   01/11/2024 Yellow Straw/Yellow Final     Clarity, UA   Date Value Ref Range Status   01/11/2024 Clear Clear Final     Bilirubin Urine   Date Value Ref Range Status   01/11/2024 Negative Negative Final     Ketones, Urine   Date Value Ref Range Status   01/11/2024 Negative Negative mg/dL Final     Specific Gravity, UA   Date Value Ref Range Status   01/11/2024 1.005 1.005 - 1.030 Final     Blood, Urine   Date Value Ref Range Status   01/11/2024 Negative Negative Final     Protein, UA   Date Value Ref Range Status   01/11/2024 Negative Negative mg/dL Final

## 2024-04-04 ENCOUNTER — HOSPITAL ENCOUNTER (OUTPATIENT)
Dept: INFUSION THERAPY | Age: 69
Discharge: HOME | End: 2024-04-04
Payer: COMMERCIAL

## 2024-04-04 ENCOUNTER — OFFICE VISIT (OUTPATIENT)
Dept: ONCOLOGY | Age: 69
End: 2024-04-04

## 2024-04-04 VITALS
OXYGEN SATURATION: 91 % | TEMPERATURE: 97.7 F | HEIGHT: 69 IN | BODY MASS INDEX: 31.81 KG/M2 | DIASTOLIC BLOOD PRESSURE: 80 MMHG | WEIGHT: 214.8 LBS | HEART RATE: 88 BPM | SYSTOLIC BLOOD PRESSURE: 143 MMHG

## 2024-04-04 DIAGNOSIS — D73.89 LESION OF SPLEEN: ICD-10-CM

## 2024-04-04 DIAGNOSIS — R91.1 LUNG NODULE: ICD-10-CM

## 2024-04-04 DIAGNOSIS — Z85.21 HISTORY OF LARYNGEAL CANCER: Primary | ICD-10-CM

## 2024-04-04 PROCEDURE — 99211 OFF/OP EST MAY X REQ PHY/QHP: CPT

## 2024-04-04 NOTE — PROGRESS NOTES
MA Rooming Questions  Patient: Brayan Galeana  MRN: [id number]    Date: 4/4/2024        1. Do you have any new issues?   no         2. Do you need any refills on medications?    no    3. Have you had any imaging done since your last visit?   no    4. Have you been hospitalized or seen in the emergency room since your last visit here?   no    5. Did the patient have a depression screening completed today? No    No data recorded     PHQ-9 Given to (if applicable):               PHQ-9 Score (if applicable):                     [] Positive     []  Negative              Does question #9 need addressed (if applicable)                     [] Yes    []  No               Marimar Saha CMA      
    PET/CT on 1/26/24 showed 1.7 cm left upper lobe pulmonary nodule has metabolic characteristics most concerning for primary lung malignancy. No findings of FDG avid metastatic disease.      MRI abdomen/pelvis done on 1/31/24 showed stable 5 mm enhancing splenic lesion favoring hemangioma given lack of FDG uptake on recent PET-CT although small size is likely below the resolution for PET imaging. 12 mm T1 hyperintense lesion in the upper pole left kidney favoring a Bosniak 2 versus 2F cyst.  This can also be followed on subsequent exam.    He underwent CT guided biopsy of BALWINDER lung nodule on 2/15/24 and pathology showed moderately differentiated squamous cell carcinoma.     He received SBRT between 3/11/24 and 3/15/24 (5000 cGy, 5/5 fractions).     On April 4, 2024, he presented to me for follow up. Reviewed findings on MRI, PET/CT and biopsy. I discussed his case with Dr. Paul and Dr. Katz.     We believe he has solitary stage I BALWINDER lung cancer and we feel that EBUS is not necessary. Anesthesiology was concerned to give anesthesia for EBUS in view of his cardiac history. We also believe that he is not a candidate for surgery in view of underlying medical conditions.     We recommend SBRT to solitary BALWINDER lung cancer. He received SBRT between 3/11/24 and 3/15/24 (5000 cGy, 5/5 fractions).     I will request repeat CT chest soon to re evaluate his lung cancer. Will also request CT abdomen/pelvis to evaluate small splenic lesion and left kidney lesion.     For small splenic lesion and left kidney lesion - Will have repeat CT scans of abdomen and pelvis when we have repeat scans after SBRT.     I answered all his questions and concerns for today. Recent imaging and labs were reviewed and discussed with the patient.

## 2024-04-05 ENCOUNTER — TELEPHONE (OUTPATIENT)
Dept: ONCOLOGY | Age: 69
End: 2024-04-05

## 2024-04-05 NOTE — TELEPHONE ENCOUNTER
Call placed to patient to give him upcoming appointment times and dates for CT chest/abdomen/pelvis.    [facility]. Patient is to be NPO x4 hours prior to appointment.    Patient did not answer, message left with this RN name and direct contact information for call back left on home phone and cell phone.      1622-Patient returned call and given above appointment information. Patient verbalizes understanding and has no questions.

## 2024-04-11 ENCOUNTER — HOSPITAL ENCOUNTER (OUTPATIENT)
Dept: CT IMAGING | Age: 69
Discharge: HOME | End: 2024-04-11
Payer: COMMERCIAL

## 2024-04-11 DIAGNOSIS — Z85.21 HISTORY OF LARYNGEAL CANCER: ICD-10-CM

## 2024-04-11 DIAGNOSIS — D73.89 LESION OF SPLEEN: ICD-10-CM

## 2024-04-11 DIAGNOSIS — R10.84 GENERALIZED ABDOMINAL PAIN: ICD-10-CM

## 2024-04-11 DIAGNOSIS — R91.1 LUNG NODULE: ICD-10-CM

## 2024-04-11 PROCEDURE — 6360000004 HC RX CONTRAST MEDICATION: Performed by: INTERNAL MEDICINE

## 2024-04-11 PROCEDURE — 71260 CT THORAX DX C+: CPT

## 2024-04-11 PROCEDURE — 74177 CT ABD & PELVIS W/CONTRAST: CPT

## 2024-04-11 RX ADMIN — IOPAMIDOL 100 ML: 755 INJECTION, SOLUTION INTRAVENOUS at 09:40

## 2024-04-22 NOTE — PROGRESS NOTES
Patient Name:  Brayan Galeana  Patient :  1955  Patient MRN:  [id number]     Primary Oncologist: Butch Acevedo MD  Referring Provider: Beverly Marie APRN - NP     Date of Service: 2024      Chief Complaint:    Chief Complaint   Patient presents with    Follow-up     Patient Active Problem List:     Suicide attempt (HCC)     Pacemaker     CHF (congestive heart failure) (HCC)     Lung nodule     Lesion of spleen     History of CVA in adulthood     History of laryngeal cancer     Personal history of MI (myocardial infarction)     Postoperative hypothyroidism     Mixed hyperlipidemia     History of laryngectomy     Primary hypertension    HPI:   Brayan Galeana is a 68 y.o. male with medical history significant for HTN, hyperlipidemia, hypothyroidism, CAD, CHF, s/p pacemaker, history of laryngeal cancer, s/p laryngectomy in  followed by adjuvant radiation therapy, referred to me on 24 for evaluation of lung nodule and splenic lesion.     He has been following with ENT surgeon at [facility] for his history of laryngeal cancer. He had CT soft tissue neck on 23 and it showed spiculated 1.9 cm partially visualized subpleural nodule in left upper lobe.     Subsequently had CT chest with contrast on 23 and it showed  1.5 x 1.7 cm peripheral left upper lobe nodule noted with a small peripheral lucency. This could represent a cavitating neoplastic nodule versus infection. Consider short-term follow-up CT, PET/CT and/or biopsy for further evaluation. 8mm hyperdense area of nodularity noted in the spleen. Both benign etiology such is a hemangioma and hypervascular metastasis are considerations. Consider a follow-up splenic protocol follow-up MRI.     His oncologist at [facility] requested CT guided biopsy but he cancelled it since he wants to have it done closer to home. He was referred to me for further evaluation.     He is a former smoker and he quits smoking since . He used to smoke 1.5 PPD for about 50

## 2024-04-25 ENCOUNTER — HOSPITAL ENCOUNTER (OUTPATIENT)
Dept: INFUSION THERAPY | Age: 69
Discharge: HOME | End: 2024-04-25
Payer: COMMERCIAL

## 2024-04-25 ENCOUNTER — OFFICE VISIT (OUTPATIENT)
Dept: ONCOLOGY | Age: 69
End: 2024-04-25

## 2024-04-25 ENCOUNTER — HOSPITAL ENCOUNTER (OUTPATIENT)
Dept: RADIATION ONCOLOGY | Age: 69
Discharge: HOME | End: 2024-04-25

## 2024-04-25 VITALS
WEIGHT: 217 LBS | RESPIRATION RATE: 16 BRPM | HEART RATE: 77 BPM | BODY MASS INDEX: 32.14 KG/M2 | SYSTOLIC BLOOD PRESSURE: 146 MMHG | OXYGEN SATURATION: 94 % | DIASTOLIC BLOOD PRESSURE: 80 MMHG | TEMPERATURE: 97.4 F | HEIGHT: 69 IN

## 2024-04-25 VITALS
OXYGEN SATURATION: 94 % | WEIGHT: 217 LBS | BODY MASS INDEX: 32.14 KG/M2 | HEIGHT: 69 IN | SYSTOLIC BLOOD PRESSURE: 146 MMHG | TEMPERATURE: 97.4 F | RESPIRATION RATE: 16 BRPM | DIASTOLIC BLOOD PRESSURE: 80 MMHG | HEART RATE: 77 BPM

## 2024-04-25 DIAGNOSIS — R91.1 LUNG NODULE: ICD-10-CM

## 2024-04-25 DIAGNOSIS — Z85.21 HISTORY OF LARYNGEAL CANCER: Primary | ICD-10-CM

## 2024-04-25 DIAGNOSIS — D73.89 LESION OF SPLEEN: ICD-10-CM

## 2024-04-25 PROCEDURE — 99211 OFF/OP EST MAY X REQ PHY/QHP: CPT

## 2024-04-25 NOTE — PROGRESS NOTES
[facility]   Radiation Oncology Center  [address] [place] [id number]  Phone: [phone]  Fax: [phone]    RADIATION ONCOLOGY FOLLOW UP REPORT    PATIENT NAME:  Brayan Galeana              : 1955  MEDICAL RECORD NO: [id number]    [facility] NO: [id number]        PROVIDER: José Katz MD      DATE OF SERVICE: 2024     Other Physicians:  Dr. Acevedo    DIAGNOSIS: SCC BALWINDER of lung [id number]      TREATMENT COURSE: SBRT 50Gy/5fx completed 3/15/24    HPI:   Brayan Galeana is a 68 y.o. male who has a history as above who returns today for routine follow-up visit.  He completed treatments 3/15/24 and was doing well at that time.     CT C/A/P 24 showed the spiculated subpleural mass left upper lobe was decreased in size from prior study no evidence of metastatic disease in the thorax, abdomen, or pelvis.    Currently, the patient reports he's been doing well.  His energy level has been fairly good overall.  He denies any fevers, chills, or drenching night sweats.  His weight and appetite have been stable.  He denies any chest pain or shortness of breath.  He has not noticed any new lumps or bumps anywhere throughout his body.  He denies the new onset of bony pain.  He has not been experiencing any pain within the abdomen or pelvis.  He denies any dysuria, hematuria, diarrhea, melena, or hematochezia.     RADIOLOGIC STUDIES:  CT CHEST W CONTRAST    Result Date: 2024  EXAM: CT CHEST W CONTRAST CT ABDOMEN PELVIS-WITH CONTRAST INDICATION: Personal history of malignant neoplasm of larynx; Solitary pulmonary nodule; Other diseases of spleen ,  Personal history of malignant neoplasm of larynx / STAT Creatinine as needed:->Yes COMPARISON: PET/CT 2024 and CAT scan 3/6/2024 TECHNIQUE: Spiral CT of the chest, abdomen and pelvis with multiplanar reformatted images provided for review. CT radiation dose optimization techniques (automated exposure control, use of a iterative

## 2024-04-25 NOTE — PROGRESS NOTES
MA Rooming Questions  Patient: Brayan Galeana  MRN: [id number]    Date: 4/25/2024        1. Do you have any new issues?   no         2. Do you need any refills on medications?    no    3. Have you had any imaging done since your last visit?   yes - ct 4/11    4. Have you been hospitalized or seen in the emergency room since your last visit here?   no    5. Did the patient have a depression screening completed today? No    No data recorded     PHQ-9 Given to (if applicable):               PHQ-9 Score (if applicable):                     [] Positive     []  Negative              Does question #9 need addressed (if applicable)                     [] Yes    []  No               Amanda Kidd MA

## 2024-04-25 NOTE — PROGRESS NOTES
Brayan Galeana  4/25/2024    Patient is seen today for follow up.     Vitals:    04/25/24 1432   BP: (!) 146/80   Pulse: 77   Resp: 16   Temp: 97.4 °F (36.3 °C)   SpO2: 94%        Oxygen Therapy  SpO2: 94 %  Pulse Oximetry Type: Intermittent  Pulse Oximeter Device Location: Finger  Oxygen Therapy: None (Room air)    Wt Readings from Last 3 Encounters:   04/25/24 98.4 kg (217 lb)   04/25/24 98.4 kg (217 lb)   04/04/24 97.4 kg (214 lb 12.8 oz)       Pain Assessment  Pain Assessment: None - Denies Pain  Denies Need for Intervention     No Known Allergies     Current Outpatient Medications   Medication Sig Dispense Refill    gabapentin (NEURONTIN) 800 MG tablet Take 2 tablets by mouth daily for 30 days. 60 tablet 1    ipratropium 0.5 mg-albuterol 2.5 mg (DUONEB) 0.5-2.5 (3) MG/3ML SOLN nebulizer solution Inhale 3 mLs into the lungs 4 times daily 360 mL 1    levothyroxine (SYNTHROID) 137 MCG tablet Take 1 tablet by mouth once daily 30 tablet 0    albuterol (ACCUNEB) 0.63 MG/3ML nebulizer solution Take 3 mLs by nebulization every 6 hours as needed for Wheezing      carvedilol (COREG) 6.25 MG tablet Take 1 tablet by mouth 2 times daily 180 tablet 2    lisinopril (PRINIVIL;ZESTRIL) 5 MG tablet Take 1 tablet by mouth daily 90 tablet 2    spironolactone (ALDACTONE) 25 MG tablet Take 1 tablet by mouth daily 90 tablet 2    omeprazole (PRILOSEC) 20 MG delayed release capsule Take 1 capsule by mouth daily 90 capsule 2    clopidogrel (PLAVIX) 75 MG tablet Take 1 tablet by mouth daily 90 tablet 2    atorvastatin (LIPITOR) 80 MG tablet Take 1 tablet by mouth daily 90 tablet 2    aspirin 81 MG EC tablet Take 1 tablet by mouth daily       No current facility-administered medications for this encounter.        Additional Comments: pt here for a f/u rad appt    Electronically signed by Amanda Kidd MA on 4/25/2024 at 2:34 PM

## 2024-06-10 ENCOUNTER — TELEPHONE (OUTPATIENT)
Dept: CARDIOLOGY CLINIC | Age: 69
End: 2024-06-10

## 2024-06-10 NOTE — TELEPHONE ENCOUNTER
Called to patient the results of recent testing echo -    Left Ventricle: Moderately reduced left ventricular systolic function with a visually estimated EF of 35 - 40%. Left ventricle size is normal. LVIDd is 5.7 cm. Normal wall thickness. Akinesis of the following segments: mid inferolateral. Grade I diastolic dysfunction with normal LAP.    Mitral Valve: Mild regurgitation.    Left Atrium: Left atrium is mildly dilated.    Pericardium: No pericardial effusion.    Image quality is good.    Compared to 2021 study LV size and function has improved.    Patient verbalized understanding of all information given.

## 2024-06-18 ENCOUNTER — OFFICE VISIT (OUTPATIENT)
Dept: CARDIOLOGY CLINIC | Age: 69
End: 2024-06-18
Payer: COMMERCIAL

## 2024-06-18 VITALS
DIASTOLIC BLOOD PRESSURE: 64 MMHG | HEIGHT: 69 IN | HEART RATE: 88 BPM | WEIGHT: 220 LBS | BODY MASS INDEX: 32.58 KG/M2 | SYSTOLIC BLOOD PRESSURE: 112 MMHG

## 2024-06-18 DIAGNOSIS — I10 PRIMARY HYPERTENSION: ICD-10-CM

## 2024-06-18 DIAGNOSIS — I50.22 CHRONIC SYSTOLIC CONGESTIVE HEART FAILURE (HCC): ICD-10-CM

## 2024-06-18 DIAGNOSIS — Z95.810 ICD (IMPLANTABLE CARDIOVERTER-DEFIBRILLATOR), DUAL, IN SITU: Primary | ICD-10-CM

## 2024-06-18 DIAGNOSIS — I50.20 SYSTOLIC CONGESTIVE HEART FAILURE, UNSPECIFIED HF CHRONICITY (HCC): ICD-10-CM

## 2024-06-18 DIAGNOSIS — I25.10 CORONARY ARTERY DISEASE INVOLVING NATIVE CORONARY ARTERY OF NATIVE HEART WITHOUT ANGINA PECTORIS: ICD-10-CM

## 2024-06-18 DIAGNOSIS — Z85.21 HISTORY OF LARYNGEAL CANCER: ICD-10-CM

## 2024-06-18 PROCEDURE — 99214 OFFICE O/P EST MOD 30 MIN: CPT | Performed by: INTERNAL MEDICINE

## 2024-06-18 PROCEDURE — 1123F ACP DISCUSS/DSCN MKR DOCD: CPT | Performed by: INTERNAL MEDICINE

## 2024-06-18 PROCEDURE — 3074F SYST BP LT 130 MM HG: CPT | Performed by: INTERNAL MEDICINE

## 2024-06-18 PROCEDURE — 3078F DIAST BP <80 MM HG: CPT | Performed by: INTERNAL MEDICINE

## 2024-06-18 RX ORDER — CARVEDILOL 6.25 MG/1
6.25 TABLET ORAL 2 TIMES DAILY
Qty: 180 TABLET | Refills: 3 | Status: SHIPPED | OUTPATIENT
Start: 2024-06-18 | End: 2025-06-13

## 2024-06-18 RX ORDER — CLOPIDOGREL BISULFATE 75 MG/1
75 TABLET ORAL DAILY
Qty: 90 TABLET | Refills: 3 | Status: SHIPPED | OUTPATIENT
Start: 2024-06-18

## 2024-06-18 NOTE — ASSESSMENT & PLAN NOTE
Device is working well with remaining longevity of 3.2 years.  We will continue monitoring as per the protocol.

## 2024-06-18 NOTE — PROGRESS NOTES
provided.   Questions answered and patient verbalizes understanding. Follow up in 6 months,  sooner if needed.    Brennon Conley MD, 6/18/2024 11:18 AM     Please note this report has been partially produced using speech recognition software and may contain errors related to that system including errors in grammar, punctuation, and spelling, as well as words and phrases that may be inappropriate. If there are any questions or concerns please feel free to contact the dictating provider for clarification.

## 2024-06-18 NOTE — ASSESSMENT & PLAN NOTE
Blood pressure is well-controlled on current medications continue the same.  He is on carvedilol and lisinopril continue both.

## 2024-06-18 NOTE — PATIENT INSTRUCTIONS
Continue current cardiovascular medications which have been reviewed and discussed individually with you.  Continue device check as per care link schedule. Appropriate prescriptions if needed on this visit are addressed. After visit summery is provided.   Questions answered and patient verbalizes understanding. Follow up in 6 months,  sooner if needed.

## 2024-08-07 ENCOUNTER — CLINICAL DOCUMENTATION (OUTPATIENT)
Dept: ONCOLOGY | Age: 69
End: 2024-08-07

## 2024-08-07 NOTE — PROGRESS NOTES
8/7/24 spoke w/Chasity in Central scheduling and gave her the pacemaker information (serial # ) She will get forms filled out and sent to Mri department for approval for MRI of abdomen to be scheduled with CT of chest. Chasity to call me back when approved.

## 2024-08-08 ENCOUNTER — TELEPHONE (OUTPATIENT)
Dept: ONCOLOGY | Age: 69
End: 2024-08-08

## 2024-08-08 NOTE — TELEPHONE ENCOUNTER
8/8/24 - left pt vm for the CT chest/MRI of abdomen at [facility] arrival time of 9:00 am and NPO 6 hours prior(per central scheduling Radha) CT chest first and MRI to follow. I had to r/s Sterling and Curtis appts to 9/4/24 at 9:00 and 9:15 am. I am also mailing the appt information.

## 2024-08-16 ENCOUNTER — TELEPHONE (OUTPATIENT)
Dept: ONCOLOGY | Age: 69
End: 2024-08-16

## 2024-08-16 NOTE — TELEPHONE ENCOUNTER
This RN notified patient has questions related to CT scan vs the MRI order he has. This Rn called patient to discuss why Dr. Acevedo ordered MRI vs CT scan. Patient did not answer. Message left with this RN name and number for call back.

## 2024-08-16 NOTE — TELEPHONE ENCOUNTER
This RN called patient to discuss why Dr. Acevedo ordered MRI vs CT scan. Patient did not answer. Message left with this RN name and number for call back.

## 2024-08-19 ENCOUNTER — TELEPHONE (OUTPATIENT)
Dept: ONCOLOGY | Age: 69
End: 2024-08-19

## 2024-08-19 NOTE — TELEPHONE ENCOUNTER
Patient called this RN back. This RN explained as per Dr. Acevedo MRI was ordered due to CT scan being inconclusive in April. MRI hopefully will give better picture. Patient voices understanding and has no questions. Patient aware of upcoming scan appointments.

## 2024-08-28 ENCOUNTER — APPOINTMENT (OUTPATIENT)
Dept: RADIATION ONCOLOGY | Age: 69
End: 2024-08-28
Payer: COMMERCIAL

## 2024-08-29 ENCOUNTER — TELEPHONE (OUTPATIENT)
Dept: ONCOLOGY | Age: 69
End: 2024-08-29

## 2024-08-29 NOTE — TELEPHONE ENCOUNTER
8/29/24 - spoke w/ pt for the 9/11/24 MRI/CT scan at [facility] arrival time of 7:30 am and NPO 4 hours prior. MRI will be first and CT chest to follow. R/S Curtis on 9/19/24 at 10:30 am

## 2024-09-11 ENCOUNTER — HOSPITAL ENCOUNTER (OUTPATIENT)
Dept: MRI IMAGING | Age: 69
Discharge: HOME | End: 2024-09-11
Attending: INTERNAL MEDICINE
Payer: COMMERCIAL

## 2024-09-11 ENCOUNTER — HOSPITAL ENCOUNTER (OUTPATIENT)
Dept: CT IMAGING | Age: 69
Discharge: HOME | End: 2024-09-11
Attending: INTERNAL MEDICINE
Payer: COMMERCIAL

## 2024-09-11 DIAGNOSIS — R91.1 LUNG NODULE: ICD-10-CM

## 2024-09-11 DIAGNOSIS — Z85.21 HISTORY OF LARYNGEAL CANCER: ICD-10-CM

## 2024-09-11 DIAGNOSIS — D73.89 LESION OF SPLEEN: ICD-10-CM

## 2024-09-11 LAB
EGFR, POC: >90 ML/MIN/1.73M2
POC CREATININE: 0.7 MG/DL (ref 0.5–1.2)

## 2024-09-11 PROCEDURE — 6360000004 HC RX CONTRAST MEDICATION: Performed by: INTERNAL MEDICINE

## 2024-09-11 PROCEDURE — A9577 INJ MULTIHANCE: HCPCS | Performed by: INTERNAL MEDICINE

## 2024-09-11 PROCEDURE — 71260 CT THORAX DX C+: CPT

## 2024-09-11 PROCEDURE — 74183 MRI ABD W/O CNTR FLWD CNTR: CPT

## 2024-09-11 PROCEDURE — 74177 CT ABD & PELVIS W/CONTRAST: CPT

## 2024-09-11 PROCEDURE — 82565 ASSAY OF CREATININE: CPT

## 2024-09-11 RX ORDER — IOPAMIDOL 755 MG/ML
75 INJECTION, SOLUTION INTRAVASCULAR
Status: COMPLETED | OUTPATIENT
Start: 2024-09-11 | End: 2024-09-11

## 2024-09-11 RX ADMIN — GADOBENATE DIMEGLUMINE 20 ML: 529 INJECTION, SOLUTION INTRAVENOUS at 09:53

## 2024-09-11 RX ADMIN — IOPAMIDOL 75 ML: 755 INJECTION, SOLUTION INTRAVENOUS at 08:27

## 2024-09-19 ENCOUNTER — TELEPHONE (OUTPATIENT)
Dept: ONCOLOGY | Age: 69
End: 2024-09-19

## 2024-09-19 ENCOUNTER — HOSPITAL ENCOUNTER (OUTPATIENT)
Dept: INFUSION THERAPY | Age: 69
Discharge: HOME | End: 2024-09-19
Payer: COMMERCIAL

## 2024-09-19 ENCOUNTER — HOSPITAL ENCOUNTER (OUTPATIENT)
Dept: RADIATION ONCOLOGY | Age: 69
Discharge: HOME | End: 2024-09-19
Payer: COMMERCIAL

## 2024-09-19 ENCOUNTER — OFFICE VISIT (OUTPATIENT)
Dept: ONCOLOGY | Age: 69
End: 2024-09-19
Payer: COMMERCIAL

## 2024-09-19 VITALS
BODY MASS INDEX: 31.43 KG/M2 | RESPIRATION RATE: 20 BRPM | SYSTOLIC BLOOD PRESSURE: 134 MMHG | HEART RATE: 82 BPM | TEMPERATURE: 97.7 F | OXYGEN SATURATION: 92 % | WEIGHT: 212.2 LBS | DIASTOLIC BLOOD PRESSURE: 75 MMHG | HEIGHT: 69 IN

## 2024-09-19 DIAGNOSIS — C34.12 MALIGNANT NEOPLASM OF UPPER LOBE OF LEFT LUNG (HCC): ICD-10-CM

## 2024-09-19 DIAGNOSIS — Z85.21 HISTORY OF LARYNGEAL CANCER: ICD-10-CM

## 2024-09-19 DIAGNOSIS — R93.5 ABNORMAL FINDINGS ON DIAGNOSTIC IMAGING OF OTHER ABDOMINAL REGIONS, INCLUDING RETROPERITONEUM: ICD-10-CM

## 2024-09-19 DIAGNOSIS — Z85.21 HISTORY OF LARYNGEAL CANCER: Primary | ICD-10-CM

## 2024-09-19 LAB — PSA SERPL-MCNC: 3.26 NG/ML (ref 0–4)

## 2024-09-19 PROCEDURE — 84153 ASSAY OF PSA TOTAL: CPT

## 2024-09-19 PROCEDURE — 3078F DIAST BP <80 MM HG: CPT | Performed by: INTERNAL MEDICINE

## 2024-09-19 PROCEDURE — 99212 OFFICE O/P EST SF 10 MIN: CPT | Performed by: RADIOLOGY

## 2024-09-19 PROCEDURE — 99213 OFFICE O/P EST LOW 20 MIN: CPT | Performed by: INTERNAL MEDICINE

## 2024-09-19 PROCEDURE — 3075F SYST BP GE 130 - 139MM HG: CPT | Performed by: INTERNAL MEDICINE

## 2024-09-19 PROCEDURE — 36415 COLL VENOUS BLD VENIPUNCTURE: CPT

## 2024-09-19 PROCEDURE — 99214 OFFICE O/P EST MOD 30 MIN: CPT | Performed by: RADIOLOGY

## 2024-09-19 PROCEDURE — 1123F ACP DISCUSS/DSCN MKR DOCD: CPT | Performed by: INTERNAL MEDICINE

## 2024-09-19 RX ORDER — CEFDINIR 300 MG/1
300 CAPSULE ORAL 2 TIMES DAILY
Qty: 20 CAPSULE | Refills: 0 | Status: SHIPPED | OUTPATIENT
Start: 2024-09-19 | End: 2024-09-29

## 2024-09-19 ASSESSMENT — PATIENT HEALTH QUESTIONNAIRE - PHQ9
SUM OF ALL RESPONSES TO PHQ9 QUESTIONS 1 & 2: 1
SUM OF ALL RESPONSES TO PHQ QUESTIONS 1-9: 1
2. FEELING DOWN, DEPRESSED OR HOPELESS: SEVERAL DAYS
1. LITTLE INTEREST OR PLEASURE IN DOING THINGS: NOT AT ALL
SUM OF ALL RESPONSES TO PHQ QUESTIONS 1-9: 1

## 2024-09-20 VITALS
DIASTOLIC BLOOD PRESSURE: 75 MMHG | OXYGEN SATURATION: 92 % | HEART RATE: 82 BPM | SYSTOLIC BLOOD PRESSURE: 134 MMHG | RESPIRATION RATE: 20 BRPM | WEIGHT: 212 LBS | BODY MASS INDEX: 31.4 KG/M2 | TEMPERATURE: 97.7 F | HEIGHT: 69 IN

## 2024-12-15 NOTE — PROGRESS NOTES
2024     Brayan Galeana (:  1955) is a 68 y.o. male, here for evaluation of the following medical concerns:      Follow up on labs       Glucose 116 fasting   No MD dx   Not on medication         TSH low  T4 normal   On synthroid 150 long term. Post surgical removal due to laryngeal cancer.   Asymptomatic       PSA normal       UDS and contract for brandon today   Peripheral neuropathy post CVA chronic.   Reports he has been stable on brandon 800mg (two tabs) nightly for years without side effects, falls, dizziness, memory trouble. Prev PCP in [place] was prescribing med.   Has been on it since    States \"my feet feel like they're gonna bust\" - reason for taking.   States he notices a difference without it.                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 Patient did get radiation   tracheoesophageal voice prosthesis placement.   ENT changes his device in office   CT from dec 2023   Wants to see Dr david here in [place] - consult sched 24  Currently following with [facility]   Lesion on spleen  and lung nodule now   [facility]   Current speech therapy  Pt was scheduled on 23 for his order of CT guided Bx but pt canceled appt                   Review of Systems    Prior to Visit Medications    Medication Sig Taking? Authorizing Provider   lisinopril (PRINIVIL;ZESTRIL) 5 MG tablet Take 1 tablet by mouth daily Yes Beverly Marie, APRN - NP   spironolactone (ALDACTONE) 25 MG tablet Take 1 tablet by mouth daily Yes Beverly Marie, APRN - NP   omeprazole (PRILOSEC) 20 MG delayed release capsule Take 1 capsule by mouth daily Yes Beverly Marie, APRN - NP   clopidogrel (PLAVIX) 75 MG tablet Take 1 tablet by mouth daily Yes Beverly Marie, APRN - NP   carvedilol (COREG) 3.125 MG tablet Take 1 tablet by mouth 2 times daily Yes Beverly Marie, APRN - NP   atorvastatin (LIPITOR) 80 MG tablet Take 1 tablet by mouth daily Yes Beverly Marie,  Imaging Studies/Medications

## 2024-12-23 ENCOUNTER — OFFICE VISIT (OUTPATIENT)
Dept: CARDIOLOGY CLINIC | Age: 69
End: 2024-12-23
Payer: COMMERCIAL

## 2024-12-23 VITALS
SYSTOLIC BLOOD PRESSURE: 116 MMHG | BODY MASS INDEX: 31.34 KG/M2 | HEART RATE: 84 BPM | HEIGHT: 69 IN | WEIGHT: 211.6 LBS | DIASTOLIC BLOOD PRESSURE: 80 MMHG

## 2024-12-23 DIAGNOSIS — Z95.810 ICD (IMPLANTABLE CARDIOVERTER-DEFIBRILLATOR), DUAL, IN SITU: ICD-10-CM

## 2024-12-23 DIAGNOSIS — Z86.73 HISTORY OF CVA IN ADULTHOOD: ICD-10-CM

## 2024-12-23 DIAGNOSIS — I50.22 CHRONIC SYSTOLIC CONGESTIVE HEART FAILURE (HCC): Primary | ICD-10-CM

## 2024-12-23 DIAGNOSIS — I25.10 CORONARY ARTERY DISEASE INVOLVING NATIVE CORONARY ARTERY OF NATIVE HEART WITHOUT ANGINA PECTORIS: ICD-10-CM

## 2024-12-23 DIAGNOSIS — I10 PRIMARY HYPERTENSION: ICD-10-CM

## 2024-12-23 DIAGNOSIS — E78.2 MIXED HYPERLIPIDEMIA: ICD-10-CM

## 2024-12-23 PROCEDURE — 3074F SYST BP LT 130 MM HG: CPT | Performed by: INTERNAL MEDICINE

## 2024-12-23 PROCEDURE — 1159F MED LIST DOCD IN RCRD: CPT | Performed by: INTERNAL MEDICINE

## 2024-12-23 PROCEDURE — 99214 OFFICE O/P EST MOD 30 MIN: CPT | Performed by: INTERNAL MEDICINE

## 2024-12-23 PROCEDURE — 1123F ACP DISCUSS/DSCN MKR DOCD: CPT | Performed by: INTERNAL MEDICINE

## 2024-12-23 PROCEDURE — 3079F DIAST BP 80-89 MM HG: CPT | Performed by: INTERNAL MEDICINE

## 2024-12-23 NOTE — ASSESSMENT & PLAN NOTE
Clinically stable OptiVol fluid index of suggest CHF is stable.  Continue current decongestive therapy.

## 2024-12-23 NOTE — ASSESSMENT & PLAN NOTE
Continue secondary prevention by aggressive risk factor modification.  Patient is on dual antiplatelet therapy.

## 2024-12-23 NOTE — ASSESSMENT & PLAN NOTE
Device is working well with remaining battery life of 2.6 years we will continue remote monitoring.  Patient has remote monitor at home however it needs to be checked to see if it is working properly as he had not had any test done between last visit and this visit.

## 2024-12-23 NOTE — PROGRESS NOTES
function has improved.    LAB REVIEW:  CBC:   Lab Results   Component Value Date/Time    WBC 15.3 03/09/2024 02:15 AM    HGB 12.8 03/09/2024 02:15 AM    HCT 39.7 03/09/2024 02:15 AM     03/09/2024 02:15 AM     Lipids:   Lab Results   Component Value Date    CHOL 136 01/08/2024     Lab Results   Component Value Date    TRIG 121 01/08/2024     Lab Results   Component Value Date    HDL 33 (L) 01/08/2024     Lab Results   Component Value Date    LDL 79 01/08/2024     No results found for: \"VLDL\"  No results found for: \"CHOLHDLRATIO\"   Renal:   Lab Results   Component Value Date/Time    BUN 20 03/09/2024 02:15 AM    CREATININE 0.7 09/11/2024 07:54 AM    CREATININE 0.8 03/09/2024 02:15 AM     03/09/2024 02:15 AM    K 4.7 03/09/2024 02:15 AM     PT/INR:   Lab Results   Component Value Date/Time    INR 1.0 03/06/2024 09:05 PM     Lab Results   Component Value Date    LABA1C 5.5 01/11/2024     IMPRESSION and RECOMMENDATIONS:      1. Chronic systolic congestive heart failure (HCC)  Assessment & Plan:  Clinically stable OptiVol fluid index of suggest CHF is stable.  Continue current decongestive therapy.  He could benefit from adding SGLT2 medications like Jardiance or Farxiga.  2. ICD (implantable cardioverter-defibrillator), dual, in situ  Assessment & Plan:  Device is working well with remaining battery life of 2.6 years we will continue remote monitoring.  Patient has remote monitor at home however it needs to be checked to see if it is working properly as he had not had any test done between last visit and this visit.    3. Primary hypertension  Assessment & Plan:  Blood pressure is well-controlled on current medications continue the same.  He is on carvedilol and lisinopril continue both.  4. Mixed hyperlipidemia  Assessment & Plan:  Patient is on atorvastatin 80 mg daily and his last recent LDL was 79.  Continue the same.    5. History of CVA in adulthood  Assessment & Plan:  Continue secondary prevention

## 2024-12-23 NOTE — PATIENT INSTRUCTIONS
Continue current cardiovascular medications which have been reviewed and discussed individually with you.  Continue device check as per care link schedule.   Appropriate prescriptions if needed on this visit are addressed. After visit summery is provided.   Questions answered and patient verbalizes understanding. Follow up in 6 months with ECG,  sooner if needed